# Patient Record
Sex: FEMALE | Race: WHITE | Employment: UNEMPLOYED | URBAN - METROPOLITAN AREA
[De-identification: names, ages, dates, MRNs, and addresses within clinical notes are randomized per-mention and may not be internally consistent; named-entity substitution may affect disease eponyms.]

---

## 2017-01-06 DIAGNOSIS — Z01.419 WELL WOMAN EXAM: ICD-10-CM

## 2017-01-06 NOTE — TELEPHONE ENCOUNTER
Patients mother came into the office today stating her daughter told her this morning she only has one pill left of her birth control. She had an appointment with Dr. Heriberto Lozada on Jan. 11th that was provider cancellation. We rescheduled her for the next available on Feb. 15th. Patients mother is requesting a refill to last until the appointment date.      Birth control pack says - Nortrel 1/35 tablets 21S  To [address]

## 2017-02-01 DIAGNOSIS — Z01.419 WELL WOMAN EXAM: ICD-10-CM

## 2017-02-15 ENCOUNTER — OFFICE VISIT (OUTPATIENT)
Dept: FAMILY MEDICINE CLINIC | Age: 42
End: 2017-02-15

## 2017-02-15 ENCOUNTER — HOSPITAL ENCOUNTER (OUTPATIENT)
Dept: LAB | Age: 42
Discharge: HOME | End: 2017-02-15
Payer: MEDICARE

## 2017-02-15 VITALS
RESPIRATION RATE: 16 BRPM | HEART RATE: 83 BPM | TEMPERATURE: 98.4 F | DIASTOLIC BLOOD PRESSURE: 71 MMHG | OXYGEN SATURATION: 99 % | HEIGHT: 65 IN | WEIGHT: 183 LBS | BODY MASS INDEX: 30.49 KG/M2 | SYSTOLIC BLOOD PRESSURE: 113 MMHG

## 2017-02-15 DIAGNOSIS — Z01.419 WELL WOMAN EXAM: ICD-10-CM

## 2017-02-15 DIAGNOSIS — Z01.419 WELL WOMAN EXAM WITH ROUTINE GYNECOLOGICAL EXAM: Primary | ICD-10-CM

## 2017-02-15 PROCEDURE — 88175 CYTOPATH C/V AUTO FLUID REDO: CPT | Performed by: FAMILY MEDICINE

## 2017-02-15 PROCEDURE — 87624 HPV HI-RISK TYP POOLED RSLT: CPT | Performed by: FAMILY MEDICINE

## 2017-02-15 RX ORDER — DROSPIRENONE AND ETHINYL ESTRADIOL 0.02-3(28)
1 KIT ORAL DAILY
Qty: 3 PACKAGE | Refills: 3 | Status: SHIPPED | OUTPATIENT
Start: 2017-02-15 | End: 2018-01-31 | Stop reason: SDUPTHER

## 2017-02-15 NOTE — MR AVS SNAPSHOT
Visit Information Date & Time Provider Department Dept. Phone Encounter #  
 2/15/2017  3:05 PM Minal Caraballo, [address] [facility] [phone] [id number] Upcoming Health Maintenance Date Due  
 PAP AKA CERVICAL CYTOLOGY 11/12/2018 DTaP/Tdap/Td series (2 - Td) 11/29/2021 Allergies as of 2/15/2017  Review Complete On: 2/15/2017 By: Ina Saldivar LPN No Known Allergies Current Immunizations  Reviewed on 9/27/2016 Name Date Influenza Vaccine 10/8/2013, 1/3/2013 Influenza Vaccine [place] Goring) 9/24/2016 Influenza Vaccine Split 11/29/2011 PPD 1/3/2013, 11/29/2011, 11/2/2010 TB Skin Test (PPD) Intradermal 11/11/2015, 5/30/2014 TDAP Vaccine 11/29/2011 Not reviewed this visit You Were Diagnosed With   
  
 Codes Comments Well woman exam     ICD-10-CM: Q84.893 ICD-9-CM: V72.31 Vitals BP Pulse Temp Resp Height(growth percentile) Weight(growth percentile) 113/71 (BP 1 Location: Right arm, BP Patient Position: Sitting) 83 98.4 °F (36.9 °C) (Oral) 16 5' 5\" (1.651 m) 183 lb (83 kg) LMP SpO2 BMI OB Status Smoking Status (LMP Unknown) 99% 30.45 kg/m2 Having regular periods Never Smoker Vitals History BMI and BSA Data Body Mass Index Body Surface Area  
 30.45 kg/m 2 1.95 m 2 Preferred Pharmacy Pharmacy Name Phone [facility] [place], [address] [place] [facility] [phone] Your Updated Medication List  
  
   
This list is accurate as of: 2/15/17  3:31 PM.  Always use your most recent med list.  
  
  
  
  
 ABILIFY 15 mg tablet Generic drug:  ARIPiprazole Take 15 mg by mouth daily. benzonatate 100 mg capsule Commonly known as:  TESSALON Take 1 Cap by mouth three (3) times daily as needed for Cough. buPROPion  mg SR tablet Commonly known as:  Leeta William Take 150 mg by mouth daily. butalbital-acetaminophen-caffeine -40 mg per tablet Commonly known as:  Lawerence Lied Take 1 Tab by mouth every six (6) hours as needed for Pain. Max Daily Amount: 4 Tabs. cetirizine 10 mg tablet Commonly known as:  ZYRTEC Take 1 Tab by mouth daily. hydrocortisone 1 % ointment Commonly known as:  HYCORT Apply  to affected area two (2) times a day. use thin layer  
  
 mupirocin 2 % ointment Commonly known as:  [employer] Apply  to affected area daily. norethindrone-ethinyl estradiol 1-35 mg-mcg Tab Commonly known as:  ORTHO-NOVUM 1/35 (28) Take 1 Tab by mouth daily. topiramate 100 mg tablet Commonly known as:  TOPAMAX Take  by mouth daily. Patient Instructions Fasting labs in October when the flu shot is due. Eating Healthy Foods: Care Instructions Your Care Instructions Eating healthy foods can help lower your risk for disease. Healthy food gives you energy and keeps your heart strong, your brain active, your muscles working, and your bones strong. A healthy diet includes a variety of foods from the basic food groups: grains, vegetables, fruits, milk and milk products, and meat and beans. Some people may eat more of their favorite foods from only one food group and, as a result, miss getting the nutrients they need. So, it is important to pay attention not only to what you eat but also to what you are missing from your diet. You can eat a healthy, balanced diet by making a few small changes. Follow-up care is a key part of your treatment and safety. Be sure to make and go to all appointments, and call your doctor if you are having problems. Its also a good idea to know your test results and keep a list of the medicines you take. How can you care for yourself at home? Look at what you eat · Keep a food diary for a week or two and record everything you eat or drink. Track the number of servings you eat from each food group. · For a balanced diet every day, eat a variety of: ¨ 6 or more ounce-equivalents of grains, such as cereals, breads, crackers, rice, or pasta, every day. An ounce-equivalent is 1 slice of bread, 1 cup of ready-to-eat cereal, or ½ cup of cooked rice, cooked pasta, or cooked cereal. 
¨ 2½ cups of vegetables, especially: § Dark-green vegetables such as broccoli and spinach. § Orange vegetables such as carrots and sweet potatoes. § Dry beans (such as jaimes and kidney beans) and peas (such as lentils). ¨ 2 cups of fresh, frozen, or canned fruit. A small apple or 1 banana or orange equals 1 cup. ¨ 3 cups of nonfat or low-fat milk, yogurt, or other milk products. ¨ 5½ ounces of meat and beans, such as chicken, fish, lean meat, beans, nuts, and seeds. One egg, 1 tablespoon of peanut butter, ½ ounce nuts or seeds, or ¼ cup of cooked beans equals 1 ounce of meat. · Learn how to read food labels for serving sizes and ingredients. Fast-food and convenience-food meals often contain few or no fruits or vegetables. Make sure you eat some fruits and vegetables to make the meal more nutritious. · Look at your food diary. For each food group, add up what you have eaten and then divide the total by the number of days. This will give you an idea of how much you are eating from each food group. See if you can find some ways to change your diet to make it more healthy. Start small · Do not try to make dramatic changes to your diet all at once. You might feel that you are missing out on your favorite foods and then be more likely to fail. · Start slowly, and gradually change your habits. Try some of the following: ¨ Use whole wheat bread instead of white bread. ¨ Use nonfat or low-fat milk instead of whole milk. ¨ Eat brown rice instead of white rice, and eat whole wheat pasta instead of white-flour pasta. ¨ Try low-fat cheeses and low-fat yogurt. ¨ Add more fruits and vegetables to meals and have them for snacks. ¨ Add lettuce, tomato, cucumber, and onion to sandwiches. ¨ Add fruit to yogurt and cereal. 
Enjoy food · You can still eat your favorite foods. You just may need to eat less of them. If your favorite foods are high in fat, salt, and sugar, limit how often you eat them, but do not cut them out entirely. · Eat a wide variety of foods. Make healthy choices when eating out · The type of restaurant you choose can help you make healthy choices. Even fast-food chains are now offering more low-fat or healthier choices on the menu. · Choose smaller portions, or take half of your meal home. · When eating out, try: ¨ A veggie pizza with a whole wheat crust or grilled chicken (instead of sausage or pepperoni). ¨ Pasta with roasted vegetables, grilled chicken, or marinara sauce instead of cream sauce. ¨ A vegetable wrap or grilled chicken wrap. ¨ Broiled or poached food instead of fried or breaded items. Make healthy choices easy · Buy packaged, prewashed, ready-to-eat fresh vegetables and fruits, such as baby carrots, salad mixes, and chopped or shredded broccoli and cauliflower. · Buy packaged, presliced fruits, such as melon or pineapple. · Choose 100% fruit or vegetable juice instead of soda. Limit juice intake to 4 to 6 oz (½ to ¾ cup) a day. · Blend low-fat yogurt, fruit juice, and canned or frozen fruit to make a smoothie for breakfast or a snack. Where can you learn more? Go to http://philly-greer.info/. Enter T756 in the search box to learn more about \"Eating Healthy Foods: Care Instructions. \" Current as of: November 20, 2015 Content Version: 11.1 © 1599-2738 [employer]. Care instructions adapted under license by [employer] (which disclaims liability or warranty for this information).  If you have questions about a medical condition or this instruction, always ask your healthcare professional. Sharif Rosado[employer] disclaims any warranty or liability for your use of this information. Introducing [facility]! Nichole Cobian introduces Twitpay patient portal. Now you can access parts of your medical record, email your doctor's office, and request medication refills online. 1. In your internet browser, go to [email or web address] 2. Click on the First Time User? Click Here link in the Sign In box. You will see the New Member Sign Up page. 3. Enter your Twitpay Access Code exactly as it appears below. You will not need to use this code after youve completed the sign-up process. If you do not sign up before the expiration date, you must request a new code. · Twitpay Access Code: [email or web address] Expires: 5/16/2017  3:31 PM 
 
4. Enter the last four digits of your Social Security Number (xxxx) and Date of Birth (mm/dd/yyyy) as indicated and click Submit. You will be taken to the next sign-up page. 5. Create a Twitpay ID. This will be your Twitpay login ID and cannot be changed, so think of one that is secure and easy to remember. 6. Create a Twitpay password. You can change your password at any time. 7. Enter your Password Reset Question and Answer. This can be used at a later time if you forget your password. 8. Enter your e-mail address. You will receive e-mail notification when new information is available in [address] 9. Click Sign Up. You can now view and download portions of your medical record. 10. Click the Download Summary menu link to download a portable copy of your medical information. If you have questions, please visit the Frequently Asked Questions section of the Twitpay website. Remember, Twitpay is NOT to be used for urgent needs. For medical emergencies, dial 911. Now available from your iPhone and Android! Please provide this summary of care documentation to your next provider. Your primary care clinician is listed as [address] [place] [address]. If you have any questions after today's visit, please call [phone].

## 2017-02-15 NOTE — PATIENT INSTRUCTIONS
Fasting labs in October when the flu shot is due. Eating Healthy Foods: Care Instructions  Your Care Instructions  Eating healthy foods can help lower your risk for disease. Healthy food gives you energy and keeps your heart strong, your brain active, your muscles working, and your bones strong. A healthy diet includes a variety of foods from the basic food groups: grains, vegetables, fruits, milk and milk products, and meat and beans. Some people may eat more of their favorite foods from only one food group and, as a result, miss getting the nutrients they need. So, it is important to pay attention not only to what you eat but also to what you are missing from your diet. You can eat a healthy, balanced diet by making a few small changes. Follow-up care is a key part of your treatment and safety. Be sure to make and go to all appointments, and call your doctor if you are having problems. Its also a good idea to know your test results and keep a list of the medicines you take. How can you care for yourself at home? Look at what you eat  · Keep a food diary for a week or two and record everything you eat or drink. Track the number of servings you eat from each food group. · For a balanced diet every day, eat a variety of:  ¨ 6 or more ounce-equivalents of grains, such as cereals, breads, crackers, rice, or pasta, every day. An ounce-equivalent is 1 slice of bread, 1 cup of ready-to-eat cereal, or ½ cup of cooked rice, cooked pasta, or cooked cereal.  ¨ 2½ cups of vegetables, especially:  § Dark-green vegetables such as broccoli and spinach. § Orange vegetables such as carrots and sweet potatoes. § Dry beans (such as jaimes and kidney beans) and peas (such as lentils). ¨ 2 cups of fresh, frozen, or canned fruit. A small apple or 1 banana or orange equals 1 cup. ¨ 3 cups of nonfat or low-fat milk, yogurt, or other milk products.   ¨ 5½ ounces of meat and beans, such as chicken, fish, lean meat, beans, nuts, and seeds. One egg, 1 tablespoon of peanut butter, ½ ounce nuts or seeds, or ¼ cup of cooked beans equals 1 ounce of meat. · Learn how to read food labels for serving sizes and ingredients. Fast-food and convenience-food meals often contain few or no fruits or vegetables. Make sure you eat some fruits and vegetables to make the meal more nutritious. · Look at your food diary. For each food group, add up what you have eaten and then divide the total by the number of days. This will give you an idea of how much you are eating from each food group. See if you can find some ways to change your diet to make it more healthy. Start small  · Do not try to make dramatic changes to your diet all at once. You might feel that you are missing out on your favorite foods and then be more likely to fail. · Start slowly, and gradually change your habits. Try some of the following:  ¨ Use whole wheat bread instead of white bread. ¨ Use nonfat or low-fat milk instead of whole milk. ¨ Eat brown rice instead of white rice, and eat whole wheat pasta instead of white-flour pasta. ¨ Try low-fat cheeses and low-fat yogurt. ¨ Add more fruits and vegetables to meals and have them for snacks. ¨ Add lettuce, tomato, cucumber, and onion to sandwiches. ¨ Add fruit to yogurt and cereal.  Enjoy food  · You can still eat your favorite foods. You just may need to eat less of them. If your favorite foods are high in fat, salt, and sugar, limit how often you eat them, but do not cut them out entirely. · Eat a wide variety of foods. Make healthy choices when eating out  · The type of restaurant you choose can help you make healthy choices. Even fast-food chains are now offering more low-fat or healthier choices on the menu. · Choose smaller portions, or take half of your meal home. · When eating out, try:  ¨ A veggie pizza with a whole wheat crust or grilled chicken (instead of sausage or pepperoni).   ¨ Pasta with roasted vegetables, grilled chicken, or marinara sauce instead of cream sauce. ¨ A vegetable wrap or grilled chicken wrap. ¨ Broiled or poached food instead of fried or breaded items. Make healthy choices easy  · Buy packaged, prewashed, ready-to-eat fresh vegetables and fruits, such as baby carrots, salad mixes, and chopped or shredded broccoli and cauliflower. · Buy packaged, presliced fruits, such as melon or pineapple. · Choose 100% fruit or vegetable juice instead of soda. Limit juice intake to 4 to 6 oz (½ to ¾ cup) a day. · Blend low-fat yogurt, fruit juice, and canned or frozen fruit to make a smoothie for breakfast or a snack. Where can you learn more? Go to http://philly-greer.info/. Enter T756 in the search box to learn more about \"Eating Healthy Foods: Care Instructions. \"  Current as of: November 20, 2015  Content Version: 11.1  © 1949-8558 [employer]. Care instructions adapted under license by [employer] (which disclaims liability or warranty for this information). If you have questions about a medical condition or this instruction, always ask your healthcare professional. [place] [address] any warranty or liability for your use of this information.

## 2017-02-16 NOTE — PROGRESS NOTES
Presents for well woman exam  Has no complaints  Uses OCP for cycle regulation  Recently moved into an apartment    Subjective:   42 y.o. female for Well Woman Check. No LMP recorded (lmp unknown). Social History: not sexually active. Pertinent past medical hstory: no history of HTN, DVT, CAD, DM, liver disease, migraines or smoking. Patient Active Problem List   Diagnosis Code    Well woman exam with routine gynecological exam Z01.419    Metrorrhagia N92.1    Depression F32.9    Mental retardation F79     Current Outpatient Prescriptions   Medication Sig Dispense Refill    drospirenone-ethinyl estradiol (LORYNA, 28,) 3-0.02 mg tab Take 1 Tab by mouth daily. 3 Package 3    ARIPiprazole (ABILIFY) 15 mg tablet Take 15 mg by mouth daily.  topiramate (TOPAMAX) 100 mg tablet Take  by mouth daily.  buPROPion SR (WELLBUTRIN SR) 150 mg SR tablet Take 150 mg by mouth daily. No Known Allergies     ROS:  Feeling well. No dyspnea or chest pain on exertion. No abdominal pain, change in bowel habits, black or bloody stools. No urinary tract symptoms. GYN ROS: normal menses, no abnormal bleeding, pelvic pain or discharge, no breast pain or new or enlarging lumps on self exam. No neurological complaints. Objective:     Visit Vitals    /71 (BP 1 Location: Right arm, BP Patient Position: Sitting)    Pulse 83    Temp 98.4 °F (36.9 °C) (Oral)    Resp 16    Ht 5' 5\" (1.651 m)    Wt 183 lb (83 kg)    LMP  (LMP Unknown)    SpO2 99%    BMI 30.45 kg/m2     The patient appears well, alert, oriented x 3, in no distress. ENT normal.  Neck supple. No adenopathy or thyromegaly. ANTIONETTE. Lungs are clear, good air entry, no wheezes, rhonchi or rales. S1 and S2 normal, no murmurs, regular rate and rhythm. Abdomen soft without tenderness, guarding, mass or organomegaly. Extremities show no edema, normal peripheral pulses. Neurological is normal, no focal findings.     BREAST EXAM: breasts appear normal, no suspicious masses, no skin or nipple changes or axillary nodes    PELVIC EXAM: normal external genitalia, vulva, vagina, cervix, uterus and adnexa    Assessment/Plan:   well woman  mammogram  pap smear  return annually or prn    ICD-10-CM ICD-9-CM    1. Well woman exam with routine gynecological exam Z01.419 V72.31     [V72.31]   2. Well woman exam Z01.419 V72.31 drospirenone-ethinyl estradiol (LORYNA, 28,) 3-0.02 mg tab      CLIFF MAMMO BI SCREENING INCL CAD   .

## 2017-02-26 DIAGNOSIS — Z01.419 WELL WOMAN EXAM: ICD-10-CM

## 2017-02-26 NOTE — TELEPHONE ENCOUNTER
Requested Prescriptions     Pending Prescriptions Disp Refills    norethindrone-ethinyl estradiol (Franciscasteingavino 91 1/35, 28,) 1-35 mg-mcg tab 30 Tab 0     Sig: Take 1 Tab by mouth daily.

## 2017-04-03 ENCOUNTER — HOSPITAL ENCOUNTER (OUTPATIENT)
Dept: MAMMOGRAPHY | Age: 42
Discharge: HOME | End: 2017-04-03
Attending: FAMILY MEDICINE
Payer: MEDICARE

## 2017-04-03 DIAGNOSIS — Z01.419 WELL WOMAN EXAM: ICD-10-CM

## 2017-04-03 PROCEDURE — 77067 SCR MAMMO BI INCL CAD: CPT

## 2017-04-09 ENCOUNTER — OFFICE VISIT (OUTPATIENT)
Dept: FAMILY MEDICINE CLINIC | Age: 42
End: 2017-04-09

## 2017-04-09 VITALS
SYSTOLIC BLOOD PRESSURE: 96 MMHG | HEART RATE: 69 BPM | BODY MASS INDEX: 29.16 KG/M2 | WEIGHT: 175 LBS | HEIGHT: 65 IN | RESPIRATION RATE: 16 BRPM | TEMPERATURE: 98.1 F | OXYGEN SATURATION: 99 % | DIASTOLIC BLOOD PRESSURE: 63 MMHG

## 2017-04-09 DIAGNOSIS — H00.015 HORDEOLUM EXTERNUM OF LEFT LOWER EYELID: Primary | ICD-10-CM

## 2017-04-09 DIAGNOSIS — Z13.31 SCREENING FOR DEPRESSION: ICD-10-CM

## 2017-04-09 RX ORDER — GENTAMICIN SULFATE 3 MG/ML
2 SOLUTION/ DROPS OPHTHALMIC 3 TIMES DAILY
Qty: 1 BOTTLE | Refills: 0 | Status: SHIPPED | OUTPATIENT
Start: 2017-04-09 | End: 2017-04-19

## 2017-04-09 NOTE — LETTER
4/9/2017 8:28 AM 
 
Ms. Brianna Cuevas \[facility]\"" [phone] Body mass index is 29.12 kg/(m^2). Focus on regular exercise (150 minutes each week) and healthy eating. Eat more fruits and vegetables. Eat more protein (egg whites, beans, and nuts you know you tolerate) and less carbohydrates (white bread, white rice, white pasta, white potatoes, sodas, and sweets). Eat appropriately small portion sizes. Sincerely, Jose Armando Ramos MD

## 2017-04-09 NOTE — MR AVS SNAPSHOT
Visit Information Date & Time Provider Department Dept. Phone Encounter #  
 4/9/2017  8:30 AM Kayden Wang MD [facility] [phone] [id number] Follow-up Instructions Return if symptoms worsen or fail to improve. Upcoming Health Maintenance Date Due  
 PAP AKA CERVICAL CYTOLOGY 2/15/2020 DTaP/Tdap/Td series (2 - Td) 11/29/2021 Allergies as of 4/9/2017  Review Complete On: 4/9/2017 By: Kayden Wang MD  
 No Known Allergies Current Immunizations  Reviewed on 9/27/2016 Name Date Influenza Vaccine 10/8/2013, 1/3/2013 Influenza Vaccine Serjio Lass) 9/24/2016 Influenza Vaccine Split 11/29/2011 PPD 1/3/2013, 11/29/2011, 11/2/2010 TB Skin Test (PPD) Intradermal 11/11/2015, 5/30/2014 TDAP Vaccine 11/29/2011 Not reviewed this visit You Were Diagnosed With   
  
 Codes Comments Hordeolum externum of left lower eyelid    -  Primary ICD-10-CM: H00.015 
ICD-9-CM: 373.11 Screening for depression     ICD-10-CM: Z13.89 ICD-9-CM: V79.0 BMI 29.0-29.9,adult     ICD-10-CM: Y27.77 ICD-9-CM: V85.25 see letter Vitals BP Pulse Temp Resp Height(growth percentile) Weight(growth percentile) 96/63 69 98.1 °F (36.7 °C) (Oral) 16 5' 5\" (1.651 m) 175 lb (79.4 kg) LMP SpO2 BMI OB Status Smoking Status 03/20/2017 99% 29.12 kg/m2 Having regular periods Never Smoker Vitals History BMI and BSA Data Body Mass Index Body Surface Area  
 29.12 kg/m 2 1.91 m 2 Preferred Pharmacy Pharmacy Name Phone [facility] [address] [place] [facility] [phone] Your Updated Medication List  
  
   
This list is accurate as of: 4/9/17  8:46 AM.  Always use your most recent med list.  
  
  
  
  
 ABILIFY 15 mg tablet Generic drug:  ARIPiprazole Take 15 mg by mouth daily. buPROPion  mg SR tablet Commonly known as:  Louie Gonzaleze  
 Take 150 mg by mouth daily. drospirenone-ethinyl estradiol 3-0.02 mg Tab Commonly known as:  LORYNA (28) Take 1 Tab by mouth daily. gentamicin 0.3 % ophthalmic solution Commonly known as:  GARAMYCIN Administer 2 Drops to left eye three (3) times daily for 10 days. topiramate 100 mg tablet Commonly known as:  TOPAMAX Take  by mouth daily. Prescriptions Sent to Pharmacy Refills  
 gentamicin (GARAMYCIN) 0.3 % ophthalmic solution 0 Sig: Administer 2 Drops to left eye three (3) times daily for 10 days. Class: Normal  
 [facility] [place] [address] [address] [facility] #: [phone] Route: Left Eye We Performed the Following [id number] [place] [address] [facility] [ \[facility]\""] Follow-up Instructions Return if symptoms worsen or fail to improve. Patient Instructions Stay active Use eye drops in left eye 3 times a day for 5 days Follow up if symptoms persist 
 
  
Introducing [facility]! Chip Wright introduces [employer] patient portal. Now you can access parts of your medical record, email your doctor's office, and request medication refills online. 1. In your internet browser, go to [email or web address] 2. Click on the First Time User? Click Here link in the Sign In box. You will see the New Member Sign Up page. 3. Enter your Tesco Access Code exactly as it appears below. You will not need to use this code after youve completed the sign-up process. If you do not sign up before the expiration date, you must request a new code. · [employer] Access Code: [email or web address] Expires: 5/16/2017  4:31 PM 
 
4. Enter the last four digits of your Social Security Number (xxxx) and Date of Birth (mm/dd/yyyy) as indicated and click Submit. You will be taken to the next sign-up page. 5. Create a Tesco ID.  This will be your Tesco login ID and cannot be changed, so think of one that is secure and easy to remember. 6. Create a Lumaqco password. You can change your password at any time. 7. Enter your Password Reset Question and Answer. This can be used at a later time if you forget your password. 8. Enter your e-mail address. You will receive e-mail notification when new information is available in [address] 9. Click Sign Up. You can now view and download portions of your medical record. 10. Click the Download Summary menu link to download a portable copy of your medical information. If you have questions, please visit the Frequently Asked Questions section of the Lumaqco website. Remember, Lumaqco is NOT to be used for urgent needs. For medical emergencies, dial 911. Now available from your iPhone and Android! Please provide this summary of care documentation to your next provider. Your primary care clinician is listed as [address] [place] [address]. If you have any questions after today's visit, please call [phone].

## 2017-04-09 NOTE — PROGRESS NOTES
Chief Complaint   Patient presents with    Eye Problem     watering--started on friday--     1. Have you been to the ER, urgent care clinic since your last visit? Hospitalized since your last visit? no    2. Have you seen or consulted any other health care providers outside of the [address] [place] [address] since your last visit? Include any pap smears or colon screening.  No

## 2017-04-09 NOTE — PROGRESS NOTES
Glennon Call is a 42 y.o. female      Issues discussed today include:        Signs and symptoms:  Sty left eye  Duration:  3 days  Context:  Some seasonal allergies in Spring  Location:  Inner lower left lid OS  Quality:  sty  Severity:  No vision issues  Timing:  now  Modifying factors:  Rx drops    She now lives alone in apartment with support from [facility] (called '[facility] Living\")    Data reviewed or ordered today:  PHQ9 = 3, no Si/HI    Other problems include:  Patient Active Problem List   Diagnosis Code    Metrorrhagia N92.1    Depression F32.9    Mental retardation F79       Medications:  Current Outpatient Prescriptions   Medication Sig Dispense Refill    gentamicin (GARAMYCIN) 0.3 % ophthalmic solution Administer 2 Drops to left eye three (3) times daily for 10 days. 1 Bottle 0    drospirenone-ethinyl estradiol (LORYNA, 28,) 3-0.02 mg tab Take 1 Tab by mouth daily. 3 Package 3    ARIPiprazole (ABILIFY) 15 mg tablet Take 15 mg by mouth daily.  topiramate (TOPAMAX) 100 mg tablet Take  by mouth daily.  buPROPion SR (WELLBUTRIN SR) 150 mg SR tablet Take 150 mg by mouth daily. Allergies:  No Known Allergies    LMP:  Patient's last menstrual period was 03/20/2017. Social History     Social History    Marital status:      Spouse name: N/A    Number of children: N/A    Years of education: N/A     Occupational History    Not on file.      Social History Main Topics    Smoking status: Never Smoker    Smokeless tobacco: Never Used    Alcohol use No    Drug use: No    Sexual activity: Yes     Partners: Male     Birth control/ protection: Pill     Other Topics Concern    Not on file     Social History Narrative         Family History   Problem Relation Age of Onset    Depression Mother     Cancer Father      colon         Meaningful use:  done      ROS:  Headaches:  no  Chest Pain:  no  SOB:  no  Fevers:  no  Other significant ROS:  No falls, depression controlled with Rx, she sees Dr. Raphael Sutton, no SI/HI    Patient's last menstrual period was 03/20/2017. Physical Exam  Visit Vitals    BP 96/63    Pulse 69    Temp 98.1 °F (36.7 °C) (Oral)    Resp 16    Ht 5' 5\" (1.651 m)    Wt 175 lb (79.4 kg)    LMP 03/20/2017    SpO2 99%    BMI 29.12 kg/m2     BP Readings from Last 3 Encounters:   04/09/17 96/63   02/15/17 113/71   12/19/16 114/75     Constitutional:  Appears well,  No Acute Distress, Vitals noted  Psychiatric:   Affect normal, Alert and cooperative, Oriented to person/place/time    Eyes:   Pupils equally round and reactive, EOMI, conjunctiva clear, eyelids normal OD/sty lower inner lid OS  ENT:   External ears and nose normal/lips, teeth=OK/gums normal, TMs and Orophyarynx normal  Neck:   general inspection and Thyroid normal.  No abnormal cervical or supraclavicular nodes    Lungs:   clear to auscultation, good respiratory effort  Heart: Ausculation normal.  Regular rhythm. No cardiac murmurs. No carotid bruits or palpable thrills  Chest wall normal  Extremities:   without edema, good peripheral pulses  Skin:   Warm to palpation, without rashes, bruising, or suspicious lesions           Assessment:    Patient Active Problem List   Diagnosis Code    Metrorrhagia N92.1    Depression F32.9    Mental retardation F79       Today's diagnoses are:    ICD-10-CM ICD-9-CM    1. Hordeolum externum of left lower eyelid H00.015 373.11 gentamicin (GARAMYCIN) 0.3 % ophthalmic solution   2. Screening for depression Z13.89 V79.0 MN DEPRESSION SCREEN ANNUAL   3. BMI 29.0-29.9,adult Z68.29 V85.25     see letter       Plan:  Orders Placed This Encounter    MN DEPRESSION SCREEN ANNUAL    gentamicin (GARAMYCIN) 0.3 % ophthalmic solution     Sig: Administer 2 Drops to left eye three (3) times daily for 10 days.      Dispense:  1 Bottle     Refill:  0       See patient instructions  Patient Instructions   Stay active    Use eye drops in left eye 3 times a day for 5 days    Follow up if symptoms persist        refresh note:  done    AVS Printed:  done    I have reviewed/discussed the above normal BMI with the patient. I have recommended the following interventions: monitor weight .  .  letter

## 2017-07-13 ENCOUNTER — OFFICE VISIT (OUTPATIENT)
Dept: FAMILY MEDICINE CLINIC | Age: 42
End: 2017-07-13

## 2017-07-13 VITALS
RESPIRATION RATE: 18 BRPM | HEIGHT: 65 IN | OXYGEN SATURATION: 99 % | SYSTOLIC BLOOD PRESSURE: 104 MMHG | HEART RATE: 81 BPM | TEMPERATURE: 98.2 F | BODY MASS INDEX: 29.32 KG/M2 | WEIGHT: 176 LBS | DIASTOLIC BLOOD PRESSURE: 65 MMHG

## 2017-07-13 DIAGNOSIS — F79 MENTAL RETARDATION: ICD-10-CM

## 2017-07-13 DIAGNOSIS — N30.90 BLADDER INFECTION: Primary | ICD-10-CM

## 2017-07-13 LAB
BILIRUB UR QL STRIP: NEGATIVE
GLUCOSE UR-MCNC: NEGATIVE MG/DL
KETONES P FAST UR STRIP-MCNC: NEGATIVE MG/DL
PH UR STRIP: 6 [PH] (ref 4.6–8)
PROT UR QL STRIP: NEGATIVE MG/DL
SP GR UR STRIP: 1.02 (ref 1–1.03)
UA UROBILINOGEN AMB POC: NORMAL (ref 0.2–1)
URINALYSIS CLARITY POC: CLEAR
URINALYSIS COLOR POC: YELLOW
URINE BLOOD POC: NORMAL
URINE LEUKOCYTES POC: NEGATIVE
URINE NITRITES POC: NEGATIVE

## 2017-07-13 RX ORDER — LEVOFLOXACIN 250 MG/1
250 TABLET ORAL DAILY
Qty: 7 TAB | Refills: 0 | Status: SHIPPED | OUTPATIENT
Start: 2017-07-13 | End: 2017-07-20

## 2017-07-13 NOTE — PROGRESS NOTES
Eliezer Frias is a 36 y.o. female      Issues discussed today include:        Signs and symptoms:  dysuria  Duration:  24 hours  Context:  Some nausea and pain over bladder  Location:    Quality:  burns  Severity:  No fevers, paulo blood in urine (she is not menstruating)  Timing:  now  Modifying factors:  Rx levaquin    Data reviewed or ordered today:  Urine culture    Other problems include:  Patient Active Problem List   Diagnosis Code    Metrorrhagia N92.1    Depression F32.9    Mental retardation F79       Medications:  Current Outpatient Prescriptions   Medication Sig Dispense Refill    levoFLOXacin (LEVAQUIN) 250 mg tablet Take 1 Tab by mouth daily for 7 days. 7 Tab 0    drospirenone-ethinyl estradiol (LORYNA, 28,) 3-0.02 mg tab Take 1 Tab by mouth daily. 3 Package 3    ARIPiprazole (ABILIFY) 15 mg tablet Take 15 mg by mouth daily.  topiramate (TOPAMAX) 100 mg tablet Take  by mouth daily.  buPROPion SR (WELLBUTRIN SR) 150 mg SR tablet Take 150 mg by mouth daily. Allergies:  No Known Allergies    LMP:  No LMP recorded. Social History     Social History    Marital status:      Spouse name: N/A    Number of children: N/A    Years of education: N/A     Occupational History    Not on file. Social History Main Topics    Smoking status: Never Smoker    Smokeless tobacco: Never Used    Alcohol use No    Drug use: No    Sexual activity: Yes     Partners: Male     Birth control/ protection: Pill     Other Topics Concern    Not on file     Social History Narrative         Family History   Problem Relation Age of Onset    Depression Mother     Cancer Father      colon         Meaningful use:  done      ROS:  Headaches:  no  Chest Pain:  no  SOB:  no  Fevers:  no  Other significant ROS:  No h/o kidney stones    No LMP recorded.     Physical Exam  Visit Vitals    /65 (BP 1 Location: Left arm, BP Patient Position: Sitting)    Pulse 81    Temp 98.2 °F (36.8 °C) (Oral)    Resp 18    Ht 5' 5\" (1.651 m)    Wt 176 lb (79.8 kg)    SpO2 99%    BMI 29.29 kg/m2     BP Readings from Last 3 Encounters:   07/13/17 104/65   04/09/17 96/63   02/15/17 113/71     Constitutional:  Appears well,  No Acute Distress, Vitals noted  Psychiatric:   Affect normal, Alert and cooperative, Oriented to person/place/time    Eyes:   Pupils equally round and reactive, EOMI, conjunctiva clear, eyelids normal  ENT:   External ears and nose normal/lips, teeth=OK/gums normal, TMs and Orophyarynx normal  Neck:   general inspection and Thyroid normal.  No abnormal cervical or supraclavicular nodes    Lungs:   clear to auscultation, good respiratory effort  Heart: Ausculation normal.  Regular rhythm. No cardiac murmurs. No carotid bruits or palpable thrills  Chest wall normal.  No CVAT  Abd:  Benign but tender over bladder  Extremities:   without edema, good peripheral pulses  Skin:   Warm to palpation, without rashes, bruising, or suspicious lesions           Assessment:    Patient Active Problem List   Diagnosis Code    Metrorrhagia N92.1    Depression F32.9    Mental retardation F79       Today's diagnoses are:    ICD-10-CM ICD-9-CM    1. Bladder infection N30.90 595.9 AMB POC URINALYSIS DIP STICK AUTO W/O MICRO      CULTURE, URINE      levoFLOXacin (LEVAQUIN) 250 mg tablet   2. Mental retardation F79 319        Plan:  Orders Placed This Encounter    CULTURE, URINE    AMB POC URINALYSIS DIP STICK AUTO W/O MICRO    levoFLOXacin (LEVAQUIN) 250 mg tablet     Sig: Take 1 Tab by mouth daily for 7 days.      Dispense:  7 Tab     Refill:  0       See patient instructions  Patient Instructions   Drink plenty of fluids    Take antibiotic one pill daily for 7 days    If worse go to ER        refresh note:  done    AVS Printed:  done    I spoke with patient and her mom

## 2017-07-13 NOTE — MR AVS SNAPSHOT
Visit Information Date & Time Provider Department Dept. Phone Encounter #  
 7/13/2017  3:30 PM Brandon White MD [facility] [phone] [id number] Upcoming Health Maintenance Date Due INFLUENZA AGE 9 TO ADULT 8/1/2017 PAP AKA CERVICAL CYTOLOGY 2/15/2020 DTaP/Tdap/Td series (2 - Td) 11/29/2021 Allergies as of 7/13/2017  Review Complete On: 7/13/2017 By: Brandon White MD  
 No Known Allergies Current Immunizations  Reviewed on 9/27/2016 Name Date Influenza Vaccine 10/8/2013, 1/3/2013 Influenza Vaccine Geraldene Victoria) 9/24/2016 Influenza Vaccine Split 11/29/2011 PPD 1/3/2013, 11/29/2011, 11/2/2010 TB Skin Test (PPD) Intradermal 11/11/2015, 5/30/2014 TDAP Vaccine 11/29/2011 Not reviewed this visit You Were Diagnosed With   
  
 Codes Comments Bladder infection    -  Primary ICD-10-CM: N30.90 ICD-9-CM: 595.9 Mental retardation     ICD-10-CM: F79 
ICD-9-CM: 007 Vitals BP Pulse Temp Resp Height(growth percentile) Weight(growth percentile) 104/65 (BP 1 Location: Left arm, BP Patient Position: Sitting) 81 98.2 °F (36.8 °C) (Oral) 18 5' 5\" (1.651 m) 176 lb (79.8 kg) SpO2 BMI OB Status Smoking Status 99% 29.29 kg/m2 Having regular periods Never Smoker Vitals History BMI and BSA Data Body Mass Index Body Surface Area  
 29.29 kg/m 2 1.91 m 2 Preferred Pharmacy Pharmacy Name Phone [facility] [place], [address] [place] AT [facility] [phone] Your Updated Medication List  
  
   
This list is accurate as of: 7/13/17  4:08 PM.  Always use your most recent med list.  
  
  
  
  
 ABILIFY 15 mg tablet Generic drug:  ARIPiprazole Take 15 mg by mouth daily. buPROPion  mg SR tablet Commonly known as:  Juan David Balk Take 150 mg by mouth daily. drospirenone-ethinyl estradiol 3-0.02 mg Tab Commonly known as:  LORYNA (28) Take 1 Tab by mouth daily. levoFLOXacin 250 mg tablet Commonly known as:  Luciana Lugo Take 1 Tab by mouth daily for 7 days. topiramate 100 mg tablet Commonly known as:  TOPAMAX Take  by mouth daily. Prescriptions Sent to Pharmacy Refills  
 levoFLOXacin (LEVAQUIN) 250 mg tablet 0 Sig: Take 1 Tab by mouth daily for 7 days. Class: Normal  
 Pharmacy: [facility] [address] [facility] #: [phone] Route: Oral  
  
We Performed the Following AMB POC URINALYSIS DIP STICK AUTO W/O MICRO [65810 CPT(R)] CULTURE, URINE [id number] CPT(R)] Patient Instructions Drink plenty of fluids Take antibiotic one pill daily for 7 days If worse go to ER [facility]! Any Portillo introduces Appirio patient portal. Now you can access parts of your medical record, email your doctor's office, and request medication refills online. 1. In your internet browser, go to [email or web address] 2. Click on the First Time User? Click Here link in the Sign In box. You will see the New Member Sign Up page. 3. Enter your Appirio Access Code exactly as it appears below. You will not need to use this code after youve completed the sign-up process. If you do not sign up before the expiration date, you must request a new code. · Appirio Access Code: [email or web address] Expires: 10/11/2017  4:08 PM 
 
4. Enter the last four digits of your Social Security Number (xxxx) and Date of Birth (mm/dd/yyyy) as indicated and click Submit. You will be taken to the next sign-up page. 5. Create a G-Snap!t ID. This will be your Appirio login ID and cannot be changed, so think of one that is secure and easy to remember. 6. Create a G-Snap!t password. You can change your password at any time. 7. Enter your Password Reset Question and Answer. This can be used at a later time if you forget your password. 8. Enter your e-mail address. You will receive e-mail notification when new information is available in [address] 9. Click Sign Up. You can now view and download portions of your medical record. 10. Click the Download Summary menu link to download a portable copy of your medical information. If you have questions, please visit the Frequently Asked Questions section of the Guroo website. Remember, Guroo is NOT to be used for urgent needs. For medical emergencies, dial 911. Now available from your iPhone and Android! Please provide this summary of care documentation to your next provider. Your primary care clinician is listed as [facility], . If you have any questions after today's visit, please call [phone].

## 2017-07-13 NOTE — LETTER
7/17/2017 4:50 PM 
 
[facility] [phone] Dear Citlali Oquendo: 
 
Please find your most recent results below. Resulted Orders AMB POC URINALYSIS DIP STICK AUTO W/O MICRO Result Value Ref Range Color (UA POC) Yellow Clarity (UA POC) Clear Glucose (UA POC) Negative Negative Bilirubin (UA POC) Negative Negative Ketones (UA POC) Negative Negative Specific gravity (UA POC) 1.020 1.001 - 1.035 Blood (UA POC) 2+ Negative pH (UA POC) 6.0 4.6 - 8.0 Protein (UA POC) Negative Negative mg/dL Urobilinogen (UA POC) 1 mg/dL 0.2 - 1 Nitrites (UA POC) Negative Negative Leukocyte esterase (UA POC) Negative Negative CULTURE, URINE Result Value Ref Range Urine Culture, Routine No growth Narrative Performed at:  [address] [place] [address]  [id number] : Geri Jain MD, Phone:  [phone] Your urine culture was not helpful.  Finish the antibiotics.  Follow up if your symptoms persist.  If you feel better, nothing else to do. Sincerely, Gosia Palacios MD

## 2017-07-15 LAB — BACTERIA UR CULT: NO GROWTH

## 2017-07-17 NOTE — PROGRESS NOTES
Your urine culture was not helpful. Finish the antibiotics. Follow up if your symptoms persist.  If you feel better, nothing else to do.

## 2017-08-01 ENCOUNTER — OFFICE VISIT (OUTPATIENT)
Dept: FAMILY MEDICINE CLINIC | Age: 42
End: 2017-08-01

## 2017-08-01 VITALS
HEART RATE: 83 BPM | SYSTOLIC BLOOD PRESSURE: 97 MMHG | WEIGHT: 176.6 LBS | DIASTOLIC BLOOD PRESSURE: 52 MMHG | BODY MASS INDEX: 29.42 KG/M2 | HEIGHT: 65 IN | TEMPERATURE: 98.5 F | OXYGEN SATURATION: 98 % | RESPIRATION RATE: 17 BRPM

## 2017-08-01 DIAGNOSIS — L03.115 CELLULITIS OF RIGHT LOWER EXTREMITY: Primary | ICD-10-CM

## 2017-08-01 PROBLEM — L03.90 CELLULITIS: Status: ACTIVE | Noted: 2017-08-01

## 2017-08-01 RX ORDER — CEPHALEXIN 500 MG/1
500 CAPSULE ORAL 2 TIMES DAILY
Qty: 14 CAP | Refills: 0 | Status: SHIPPED | OUTPATIENT
Start: 2017-08-01 | End: 2017-08-08

## 2017-08-01 NOTE — PROGRESS NOTES
Chief Complaint   Patient presents with    Insect Bite     right leg, warm to touch, red, patient says it itches and it hurts, since Sunday.

## 2017-08-01 NOTE — PATIENT INSTRUCTIONS

## 2017-08-01 NOTE — MR AVS SNAPSHOT
Visit Information Date & Time Provider Department Dept. Phone Encounter #  
 8/1/2017  6:00 PM Rena Schilder, [facility] [phone] [id number] Your Appointments 8/15/2017  4:00 PM  
Medicare Physical with Yared Humphrey MD  
[facility] ([address]) Appt Note: [facility]   
 [address] [place] [address] [address] [place] [address]  
[phone]  
  
   
 [address] [place] [address] 3 Sierra Scales [address] Upcoming Health Maintenance Date Due INFLUENZA AGE 9 TO ADULT 8/1/2017 PAP AKA CERVICAL CYTOLOGY 2/15/2020 DTaP/Tdap/Td series (2 - Td) 11/29/2021 Allergies as of 8/1/2017  Review Complete On: 8/1/2017 By: Gloria Zavala LPN No Known Allergies Current Immunizations  Reviewed on 9/27/2016 Name Date Influenza Vaccine 10/8/2013, 1/3/2013 Influenza Vaccine Minnie Emery) 9/24/2016 Influenza Vaccine Split 11/29/2011 PPD 1/3/2013, 11/29/2011, 11/2/2010 TB Skin Test (PPD) Intradermal 11/11/2015, 5/30/2014 TDAP Vaccine 11/29/2011 Not reviewed this visit Vitals BP Pulse Temp Resp Height(growth percentile) Weight(growth percentile) 97/52 (BP 1 Location: Right arm, BP Patient Position: Sitting) 83 98.5 °F (36.9 °C) (Oral) 17 5' 5\" (1.651 m) 176 lb 9.6 oz (80.1 kg) SpO2 BMI OB Status Smoking Status 98% 29.39 kg/m2 Having regular periods Never Smoker Vitals History BMI and BSA Data Body Mass Index Body Surface Area  
 29.39 kg/m 2 1.92 m 2 Preferred Pharmacy Pharmacy Name Phone [facility] [address] AT [facility] [phone] Your Updated Medication List  
  
   
This list is accurate as of: 8/1/17  6:19 PM.  Always use your most recent med list.  
  
  
  
  
 ABILIFY 15 mg tablet Generic drug:  ARIPiprazole Take 15 mg by mouth daily. BENADRYL ALLERGY PO Take  by mouth. buPROPion  mg SR tablet Commonly known as:  Hawkins Perish Take 150 mg by mouth daily. cephALEXin 500 mg capsule Commonly known as:  Polo Walker Take 1 Cap by mouth two (2) times a day for 7 days. drospirenone-ethinyl estradiol 3-0.02 mg Tab Commonly known as:  LORYNA (28) Take 1 Tab by mouth daily. topiramate 100 mg tablet Commonly known as:  TOPAMAX Take  by mouth daily. Prescriptions Sent to Pharmacy Refills  
 cephALEXin (KEFLEX) 500 mg capsule 0 Sig: Take 1 Cap by mouth two (2) times a day for 7 days. Class: Normal  
 Pharmacy: [facility] [address] 500 [facility] #: [phone] Route: Oral  
  
Patient Instructions Cellulitis: Care Instructions Your Care Instructions Cellulitis is a skin infection. It often occurs after a break in the skin from a scrape, cut, bite, or puncture, or after a rash. The doctor has checked you carefully, but problems can develop later. If you notice any problems or new symptoms, get medical treatment right away. Follow-up care is a key part of your treatment and safety. Be sure to make and go to all appointments, and call your doctor if you are having problems. It's also a good idea to know your test results and keep a list of the medicines you take. How can you care for yourself at home? · Take your antibiotics as directed. Do not stop taking them just because you feel better. You need to take the full course of antibiotics. · Prop up the infected area on pillows to reduce pain and swelling. Try to keep the area above the level of your heart as often as you can. · If your doctor told you how to care for your wound, follow your doctor's instructions. If you did not get instructions, follow this general advice: ¨ Wash the wound with clean water 2 times a day. Don't use hydrogen peroxide or alcohol, which can slow healing. ¨ You may cover the wound with a thin layer of petroleum jelly, such as Vaseline, and a nonstick bandage. ¨ Apply more petroleum jelly and replace the bandage as needed. · Be safe with medicines. Take pain medicines exactly as directed. ¨ If the doctor gave you a prescription medicine for pain, take it as prescribed. ¨ If you are not taking a prescription pain medicine, ask your doctor if you can take an over-the-counter medicine. To prevent cellulitis in the future · Try to prevent cuts, scrapes, or other injuries to your skin. Cellulitis most often occurs where there is a break in the skin. · If you get a scrape, cut, mild burn, or bite, wash the wound with clean water as soon as you can to help avoid infection. Don't use hydrogen peroxide or alcohol, which can slow healing. · If you have swelling in your legs (edema), support stockings and good skin care may help prevent leg sores and cellulitis. · Take care of your feet, especially if you have diabetes or other conditions that increase the risk of infection. Wear shoes and socks. Do not go barefoot. If you have athlete's foot or other skin problems on your feet, talk to your doctor about how to treat them. When should you call for help? Call your doctor now or seek immediate medical care if: 
· You have signs that your infection is getting worse, such as: 
¨ Increased pain, swelling, warmth, or redness. ¨ Red streaks leading from the area. ¨ Pus draining from the area. ¨ A fever. · You get a rash. Watch closely for changes in your health, and be sure to contact your doctor if: 
· You are not getting better after 1 day (24 hours). · You do not get better as expected. Where can you learn more? Go to http://philly-greer.info/. Patti Nicole in the search box to learn more about \"Cellulitis: Care Instructions. \" Current as of: October 13, 2016 Content Version: 11.3 © 1990-9368 Healthwise, Incorporated. Care instructions adapted under license by [employer]! (which disclaims liability or warranty for this information). If you have questions about a medical condition or this instruction, always ask your healthcare professional. [address] any warranty or liability for your use of this information. Introducing [facility]! Any Portillo introduces Chumen Wenwen patient portal. Now you can access parts of your medical record, email your doctor's office, and request medication refills online. 1. In your internet browser, go to [email or web address] 2. Click on the First Time User? Click Here link in the Sign In box. You will see the New Member Sign Up page. 3. Enter your Chumen Wenwen Access Code exactly as it appears below. You will not need to use this code after youve completed the sign-up process. If you do not sign up before the expiration date, you must request a new code. · Ch[employer]wen Access Code: [email or web address] Expires: 10/11/2017  4:08 PM 
 
4. Enter the last four digits of your Social Security Number (xxxx) and Date of Birth (mm/dd/yyyy) as indicated and click Submit. You will be taken to the next sign-up page. 5. Create a Chumen Wenwen ID. This will be your Chumen Wenwen login ID and cannot be changed, so think of one that is secure and easy to remember. 6. Create a Chumen Wenwen password. You can change your password at any time. 7. Enter your Password Reset Question and Answer. This can be used at a later time if you forget your password. 8. Enter your e-mail address. You will receive e-mail notification when new information is available in [address] 9. Click Sign Up. You can now view and download portions of your medical record. 10. Click the Download Summary menu link to download a portable copy of your medical information.  
 
If you have questions, please visit the Frequently Asked Questions section of the Abiogenix. Remember, Ideal [employer] is NOT to be used for urgent needs. For medical emergencies, dial 911. Now available from your iPhone and Android! Please provide this summary of care documentation to your next provider. Your primary care clinician is listed as [address] [place] [address], . If you have any questions after today's visit, please call [phone].

## 2017-08-01 NOTE — PROGRESS NOTES
MAMIE Patel is a 36 y.o. female who presents with bug bites. 2 nights ago (Sunday 7/30 PM), pt felt a bug bite on her right leg. Increased pruritus; erythema slightly improved. No edema or raised borders; no bulls eye rash. Noticed 2 pustules; 1 has gotten slightly smaller. Used benadryl cream last night; has used 2 times with some relief. No other treatments. Has not been outside or in the woods. Has been afebrile, no chills. No bites/ rash between fingers or toes. No close contacts with similar symptoms. PMHx:  Past Medical History:   Diagnosis Date    Bipolar 1 disorder ([facility] [address]     Calculus of kidney     Depression     MR (mental retardation)        Meds:   Current Outpatient Prescriptions   Medication Sig Dispense Refill    drospirenone-ethinyl estradiol (LORYNA, 28,) 3-0.02 mg tab Take 1 Tab by mouth daily. 3 Package 3    ARIPiprazole (ABILIFY) 15 mg tablet Take 15 mg by mouth daily.  topiramate (TOPAMAX) 100 mg tablet Take  by mouth daily.  buPROPion SR (WELLBUTRIN SR) 150 mg SR tablet Take 150 mg by mouth daily. Allergies:   No Known Allergies    Smoker:  History   Smoking Status    Never Smoker   Smokeless Tobacco    Never Used       ETOH:   History   Alcohol Use No       FH:   Family History   Problem Relation Age of Onset    Depression Mother     Cancer Father      colon       ROS:  Review of Systems   Constitutional: Negative for chills, diaphoresis, fatigue and fever. HENT: Negative. Eyes: Negative for visual disturbance. Respiratory: Negative for shortness of breath. Cardiovascular: Negative for chest pain. Gastrointestinal: Negative for abdominal pain, nausea and vomiting. Musculoskeletal: Negative. Negative for joint swelling, myalgias and neck stiffness. Skin: Positive for rash. Negative for pallor. Neurological: Negative for weakness and headaches.        Physical Exam:  Visit Vitals    BP 97/52 (BP 1 Location: Right arm, BP Patient Position: Sitting)    Pulse 83    Temp 98.5 °F (36.9 °C) (Oral)    Resp 17    Ht 5' 5\" (1.651 m)    Wt 176 lb 9.6 oz (80.1 kg)    SpO2 98%    BMI 29.39 kg/m2       Wt Readings from Last 3 Encounters:   07/13/17 176 lb (79.8 kg)   04/09/17 175 lb (79.4 kg)   02/15/17 183 lb (83 kg)     BP Readings from Last 3 Encounters:   07/13/17 104/65   04/09/17 96/63   02/15/17 113/71        Physical Exam   Constitutional: She is oriented to person, place, and time. She appears well-developed and well-nourished. No distress. HENT:   Head: Normocephalic and atraumatic. Eyes: No scleral icterus. Neck: Normal range of motion. Cardiovascular: Normal rate and regular rhythm. No murmur heard. Pulmonary/Chest: Effort normal and breath sounds normal. No respiratory distress. She has no wheezes. Musculoskeletal:   Normal ROM. No edema or tenderness. Neurological: She is alert and oriented to person, place, and time. Skin: She is not diaphoretic. RLE with Erythema and warmth, ~ 13 cm. 0.5 cm and 0.25 cm pustule. Pruritic. Psychiatric: She has a normal mood and affect. Her behavior is normal.   Nursing note and vitals reviewed. Assessment     36 y.o. female with hx of MR, depression presents with area of cellulitis on RLE. Patient Active Problem List   Diagnosis Code    Metrorrhagia N92.1    Depression F32.9    Mental retardation F79       Today's diagnoses are:    ICD-10-CM ICD-9-CM    1. Cellulitis of right lower extremity L03.115 682.6               Plan     1. Cellulitis of RLE - afebrile, no systemic signs or symptoms. With mild relief of pruritus on benadryl cream.   - cephalexin 500 BID x 7 days for possible cellulitis  - benadryl cream PRN for pruritus. Follow up as needed. Prior labs and imaging were reviewed. I have discussed the diagnosis with the patient and the intended plan as seen in the above orders.  The patient has received an after-visit summary and questions were answered concerning future plans. I have discussed medication side effects and warnings with the patient as well. Patient discussed with Dr. Chad Prince, Attending Physician.     Kristi Mehta MD, PGY2  Family Medicine Resident

## 2017-09-18 ENCOUNTER — OFFICE VISIT (OUTPATIENT)
Dept: FAMILY MEDICINE CLINIC | Age: 42
End: 2017-09-18

## 2017-09-18 VITALS
OXYGEN SATURATION: 100 % | DIASTOLIC BLOOD PRESSURE: 63 MMHG | BODY MASS INDEX: 29.66 KG/M2 | WEIGHT: 178 LBS | RESPIRATION RATE: 18 BRPM | HEIGHT: 65 IN | TEMPERATURE: 98.4 F | SYSTOLIC BLOOD PRESSURE: 95 MMHG | HEART RATE: 82 BPM

## 2017-09-18 DIAGNOSIS — Z00.00 MEDICARE ANNUAL WELLNESS VISIT, SUBSEQUENT: Primary | ICD-10-CM

## 2017-09-18 DIAGNOSIS — Z23 ENCOUNTER FOR IMMUNIZATION: ICD-10-CM

## 2017-09-18 DIAGNOSIS — Z13.220 SCREENING FOR HYPERLIPIDEMIA: ICD-10-CM

## 2017-09-18 DIAGNOSIS — Z13.1 SCREENING FOR DIABETES MELLITUS: ICD-10-CM

## 2017-09-18 NOTE — PROGRESS NOTES
Chief Complaint   Patient presents with    Annual Wellness Visit     no concerns today. . here for medicare wellness questionaire. 1. Have you been to the ER, urgent care clinic since your last visit? Hospitalized since your last visit? No    2. Have you seen or consulted any other health care providers outside of the [facility] since your last visit? Include any pap smears or colon screening. No     Pt here with Mom. .      General Health Questions   -During the past 4 weeks:   -how would you rate your health in general? Good   -how often have you been bothered by feeling dizzy when standing up? never   -how much have you been bothered by bodily pain? not   -Have you noticed any hearing difficulties? no   -has your physical and emotional health limited your social activities with family or friends? no    Emotional Health Questions   -Do you have a history of depression, anxiety, or emotional problems? no  -Over the past 2 weeks, have you felt down, depressed or hopeless? no  -Over the past 2 weeks, have you felt little interest or pleasure in doing things? no    Health Habits   Please describe your diet habits: good  Do you get 5 servings of fruits or vegetables daily? yes  Do you exercise regularly? yes    Activities of Daily Living and Functional Status   -Do you need help with eating, walking, dressing, bathing, toileting, the phone, transportation, shopping, preparing meals, housework, laundry, medications or managing money? nono    -In the past four weeks, was someone available to help you if you needed and wanted help with anything? yes  -Are you confident are you that you can control and manage most of your health problems? yes  -Have you been given information to help you keep track of your medications? yes  -How often do you have trouble taking your medications as prescribed? never    Fall Risk and Home Safety   Have you fallen 2 or more times in the past year?  no  Does your home have rugs in the hallway, lack grab bars in the bathroom, lack handrails on the stairs or have poor lighting? no  Do you have smoke detectors and check them regularly? yes  Do you have difficulties driving a car? no and n/a doesn't drive. .   Do you always fasten your seat belt when you are in a car? yes

## 2017-09-18 NOTE — PROGRESS NOTES
[address]  [address] [facility] [place]   [phone]             Date of visit: 9/18/2017       This is an Subsequent Medicare Annual Wellness Visit (AWV), (Performed more than 12 months after effective date of Medicare Part B enrollment and 12 months after last preventive visit, Once in a lifetime)    I have reviewed the patient's medical history in detail and updated the computerized patient record. History obtained from: mother and the patient. Concerns today   (Patient understands that medical problems addressed today may incur additional cost as this is a preventive visit)  -None, the pt recently had physical exam with Pap smear    History     Patient Active Problem List   Diagnosis Code    Metrorrhagia N92.1    Depression F32.9    Mental retardation F79    Cellulitis L03.90     Past Medical History:   Diagnosis Date    [facility] 1 disorder ([facility] [address]     Calculus of kidney     Depression     MR (mental retardation)       History reviewed. No pertinent surgical history. No Known Allergies  Current Outpatient Prescriptions   Medication Sig Dispense Refill    drospirenone-ethinyl estradiol (LORYNA, 28,) 3-0.02 mg tab Take 1 Tab by mouth daily. 3 Package 3    ARIPiprazole (ABILIFY) 15 mg tablet Take 15 mg by mouth daily.  topiramate (TOPAMAX) 100 mg tablet Take  by mouth daily.  buPROPion SR (WELLBUTRIN SR) 150 mg SR tablet Take 150 mg by mouth daily.  DIPHENHYDRAMINE HCL (BENADRYL ALLERGY PO) Take  by mouth.        Family History   Problem Relation Age of Onset    Depression Mother     Cancer Father      colon     Social History   Substance Use Topics    Smoking status: Never Smoker    Smokeless tobacco: Never Used    Alcohol use No       Specialists/Care Team   Jadiel Chua has established care with the following healthcare providers:  -Dr. John Hernández ( psychiatrist) - usually sees him every 3 months, who is managing her depression. Health Risk Assessment     Demographics   female  36 y.o. [address] Questions   -During the past 4 weeks:   -how would you rate your health in general? Good   -how often have you been bothered by feeling dizzy when standing up? never   -how much have you been bothered by bodily pain? not   -Have you noticed any hearing difficulties? no   -has your physical and emotional health limited your social activities with family or friends? no    Emotional Health Questions   -Do you have a history of depression, anxiety, or emotional problems? no  -Over the past 2 weeks, have you felt down, depressed or hopeless? no  -Over the past 2 weeks, have you felt little interest or pleasure in doing things? no    Health Habits   Please describe your diet habits: Good. Veggies, fruits and salads  Do you get 5 servings of fruits or vegetables daily? yes  Do you exercise regularly? yes    Activities of Daily Living and Functional Status   -Do you need help with eating, walking, dressing, bathing, toileting, the phone, transportation, shopping, preparing meals, housework, laundry, medications or managing money? no  -In the past four weeks, was someone available to help you if you needed and wanted help with anything? yes  -Are you confident are you that you can control and manage most of your health problems? yes  -Have you been given information to help you keep track of your medications? yes  -How often do you have trouble taking your medications as prescribed? never    Fall Risk and Home Safety   Have you fallen 2 or more times in the past year? no  Does your home have rugs in the hallway, lack grab bars in the bathroom, lack handrails on the stairs or have poor lighting? yes  Do you have smoke detectors and check them regularly?  yes  Do you have difficulties driving a car?  does not drive  Do you always fasten your seat belt when you are in a car? yes    Review of Systems (if indicated for problems addressed today)   Not indicated    Physical Examination     Vitals:    09/18/17 1627   BP: 95/63   Pulse: 82   Resp: 18   Temp: 98.4 °F (36.9 °C)   TempSrc: Oral   SpO2: 100%   Weight: 178 lb (80.7 kg)   Height: 5' 5\" (1.651 m)     Body mass index is 29.62 kg/(m^2). No exam data present    Evaluation of Cognitive Function   Mood/affect:  neutral  Orientation: Person, Place and Time  Appearance: age appropriate and casually dressed  Family member/caregiver input: Mother helps the pt, however pt lives alone and taking care of her self    Preventive Services     Discussed today Done Previously Not Needed      X Pneumococcal vaccines   X   Flu vaccine     X Hepatitis B vaccine (if at risk)     X Shingles vaccine    X(11/2011)  TDAP vaccine    X(4/2017)  Mammogram    X(2/2017)  Pap smear     X Colorectal cancer screening     X Low-dose CT for lung cancer screening     X Bone density test     X Glaucoma screening     X Cholesterol test     X Diabetes screening test      X Diabetes self-management class     X Nutritionist referral for diabetes or renal disease       Assessment/Plan   V70.0    ICD-10-CM ICD-9-CM    1. Medicare annual wellness visit, subsequent L12.26 A64.7 METABOLIC PANEL, COMPREHENSIVE      CBC W/O DIFF      LIPID PANEL   2. Screening for diabetes mellitus E27.2 V01.5 METABOLIC PANEL, COMPREHENSIVE   3. Screening for hyperlipidemia Z13.220 V77.91    4. Encounter for immunization Z23 V03.89 INFLUENZA VIRUS VAC QUAD,SPLIT,PRESV FREE SYRINGE IM      ADMIN INFLUENZA VIRUS VAC       Orders Placed This Encounter    INFLUENZA VIRUS VACCINE QUADRIVALENT, PRESERVATIVE FREE SYRINGE (41391)    METABOLIC PANEL, COMPREHENSIVE    CBC W/O DIFF    LIPID PANEL    ADMIN INFLUENZA VIRUS VAC       Follow-up Disposition:  Return if symptoms worsen or fail to improve.     Gabino Trivedi MD

## 2017-09-18 NOTE — PATIENT INSTRUCTIONS
Discussed today Done Previously Not Needed      X Pneumococcal vaccines   X   Flu vaccine     X Hepatitis B vaccine (if at risk)     X Shingles vaccine    X(11/2011)  TDAP vaccine    X(4/2017)  Mammogram    X(2/2017)  Pap smear     X Colorectal cancer screening     X Low-dose CT for lung cancer screening     X Bone density test     X Glaucoma screening     X Cholesterol test     X Diabetes screening test      X Diabetes self-management class     X Nutritionist referral for diabetes or renal disease        Well Visit, Ages 25 to 48: Care Instructions  Your Care Instructions  Physical exams can help you stay healthy. Your doctor has checked your overall health and may have suggested ways to take good care of yourself. He or she also may have recommended tests. At home, you can help prevent illness with healthy eating, regular exercise, and other steps. Follow-up care is a key part of your treatment and safety. Be sure to make and go to all appointments, and call your doctor if you are having problems. It's also a good idea to know your test results and keep a list of the medicines you take. How can you care for yourself at home? · Reach and stay at a healthy weight. This will lower your risk for many problems, such as obesity, diabetes, heart disease, and high blood pressure. · Get at least 30 minutes of physical activity on most days of the week. Walking is a good choice. You also may want to do other activities, such as running, swimming, cycling, or playing tennis or team sports. Discuss any changes in your exercise program with your doctor. · Do not smoke or allow others to smoke around you. If you need help quitting, talk to your doctor about stop-smoking programs and medicines. These can increase your chances of quitting for good. · Talk to your doctor about whether you have any risk factors for sexually transmitted infections (STIs).  Having one sex partner (who does not have STIs and does not have sex with anyone else) is a good way to avoid these infections. · Use birth control if you do not want to have children at this time. Talk with your doctor about the choices available and what might be best for you. · Protect your skin from too much sun. When you're outdoors from 10 a.m. to 4 p.m., stay in the shade or cover up with clothing and a hat with a wide brim. Wear sunglasses that block UV rays. Even when it's cloudy, put broad-spectrum sunscreen (SPF 30 or higher) on any exposed skin. · See a dentist one or two times a year for checkups and to have your teeth cleaned. · Wear a seat belt in the car. · Drink alcohol in moderation, if at all. That means no more than 2 drinks a day for men and 1 drink a day for women. Follow your doctor's advice about when to have certain tests. These tests can spot problems early. For everyone  · Cholesterol. Have the fat (cholesterol) in your blood tested after age 21. Your doctor will tell you how often to have this done based on your age, family history, or other things that can increase your risk for heart disease. · Blood pressure. Have your blood pressure checked during a routine doctor visit. Your doctor will tell you how often to check your blood pressure based on your age, your blood pressure results, and other factors. · Vision. Talk with your doctor about how often to have a glaucoma test.  · Diabetes. Ask your doctor whether you should have tests for diabetes. · Colon cancer. Have a test for colon cancer at age 48. You may have one of several tests. If you are younger than 48, you may need a test earlier if you have any risk factors. Risk factors include whether you already had a precancerous polyp removed from your colon or whether your parent, brother, sister, or child has had colon cancer.   For women  · Breast exam and mammogram. Talk to your doctor about when you should have a clinical breast exam and a mammogram. Medical experts differ on whether and how often women under 50 should have these tests. Your doctor can help you decide what is right for you. · Pap test and pelvic exam. Begin Pap tests at age 24. A Pap test is the best way to find cervical cancer. The test often is part of a pelvic exam. Ask how often to have this test.  · Tests for sexually transmitted infections (STIs). Ask whether you should have tests for STIs. You may be at risk if you have sex with more than one person, especially if your partners do not wear condoms. For men  · Tests for sexually transmitted infections (STIs). Ask whether you should have tests for STIs. You may be at risk if you have sex with more than one person, especially if you do not wear a condom. · Testicular cancer exam. Ask your doctor whether you should check your testicles regularly. · Prostate exam. Talk to your doctor about whether you should have a blood test (called a PSA test) for prostate cancer. Experts differ on whether and when men should have this test. Some experts suggest it if you are older than 39 and are -American or have a father or brother who got prostate cancer when he was younger than 72. When should you call for help? Watch closely for changes in your health, and be sure to contact your doctor if you have any problems or symptoms that concern you. Where can you learn more? Go to http://philly-greer.info/. Enter P072 in the search box to learn more about \"Well Visit, Ages 25 to 48: Care Instructions. \"  Current as of: July 19, 2016  Content Version: 11.3  © 7213-5177 [employer]. Care instructions adapted under license by [employer] (which disclaims liability or warranty for this information). If you have questions about a medical condition or this instruction, always ask your healthcare professional. [place] [address] any warranty or liability for your use of this information.

## 2017-09-18 NOTE — MR AVS SNAPSHOT
Visit Information Date & Time Provider Department Dept. Phone Encounter #  
 9/18/2017  4:00 PM Missael Saldivar MD [address] [place] [address] [phone] [id number] Upcoming Health Maintenance Date Due INFLUENZA AGE 9 TO ADULT 8/1/2017 PAP AKA CERVICAL CYTOLOGY 2/15/2020 DTaP/Tdap/Td series (2 - Td) 11/29/2021 Allergies as of 9/18/2017  Review Complete On: 9/18/2017 By: Missael Saldivar MD  
 No Known Allergies Current Immunizations  Reviewed on 9/18/2017 Name Date Influenza Vaccine 10/8/2013, 1/3/2013 Influenza Vaccine [place]) 9/24/2016 Influenza Vaccine (Quad) PF  Incomplete Influenza Vaccine Split 11/29/2011 PPD 1/3/2013, 11/29/2011, 11/2/2010 TB Skin Test (PPD) Intradermal 11/11/2015, 5/30/2014 TDAP Vaccine 11/29/2011 Reviewed by Missael Saldivar MD on 9/18/2017 at  4:51 PM  
 Reviewed by Missael Saldivar MD on 9/18/2017 at  4:53 PM  
You Were Diagnosed With   
  
 Codes Comments Medicare annual wellness visit, subsequent    -  Primary ICD-10-CM: Z00.00 ICD-9-CM: V70.0 Screening for diabetes mellitus     ICD-10-CM: Z13.1 ICD-9-CM: V77.1 Screening for hyperlipidemia     ICD-10-CM: Z13.220 ICD-9-CM: V77.91 Encounter for immunization     ICD-10-CM: C14 ICD-9-CM: V03.89 Vitals BP Pulse Temp Resp Height(growth percentile) Weight(growth percentile) 95/63 82 98.4 °F (36.9 °C) (Oral) 18 5' 5\" (1.651 m) 178 lb (80.7 kg) SpO2 BMI OB Status Smoking Status 100% 29.62 kg/m2 Medically Induced Never Smoker BMI and BSA Data Body Mass Index Body Surface Area  
 29.62 kg/m 2 1.92 m 2 Preferred Pharmacy Pharmacy Name Phone [facility] [place], [address] [place] [facility] [phone] Your Updated Medication List  
  
   
This list is accurate as of: 9/18/17  5:05 PM.  Always use your most recent med list.  
  
 ABILIFY 15 mg tablet Generic drug:  ARIPiprazole Take 15 mg by mouth daily. BENADRYL ALLERGY PO Take  by mouth. buPROPion  mg SR tablet Commonly known as:  Alimarco Marty Take 150 mg by mouth daily. drospirenone-ethinyl estradiol 3-0.02 mg Tab Commonly known as:  LORYNA (28) Take 1 Tab by mouth daily. topiramate 100 mg tablet Commonly known as:  TOPAMAX Take  by mouth daily. We Performed the Following ADMIN INFLUENZA VIRUS VAC [ [facility] [place]] CBC W/O DIFF [50972 CPT(R)] INFLUENZA VIRUS VAC QUAD,SPLIT,PRESV FREE SYRINGE IM [id number] CPT(R)] LIPID PANEL [03302 CPT(R)] To-Do List   
 09/18/2017 Lab:  METABOLIC PANEL, COMPREHENSIVE Patient Instructions Discussed today Done Previously Not Needed X Pneumococcal vaccines X   Flu vaccine X Hepatitis B vaccine (if at risk) X Shingles vaccine X(11/2011)  TDAP vaccine X(4/2017)  Mammogram  
 X(2/2017)  Pap smear X Colorectal cancer screening X Low-dose CT for lung cancer screening X Bone density test  
  X Glaucoma screening X Cholesterol test  
  X Diabetes screening test   
  X Diabetes self-management class X Nutritionist referral for diabetes or renal disease Well Visit, Ages 25 to 48: Care Instructions Your Care Instructions Physical exams can help you stay healthy. Your doctor has checked your overall health and may have suggested ways to take good care of yourself. He or she also may have recommended tests. At home, you can help prevent illness with healthy eating, regular exercise, and other steps. Follow-up care is a key part of your treatment and safety. Be sure to make and go to all appointments, and call your doctor if you are having problems. It's also a good idea to know your test results and keep a list of the medicines you take. How can you care for yourself at home? · Reach and stay at a healthy weight. This will lower your risk for many problems, such as obesity, diabetes, heart disease, and high blood pressure. · Get at least 30 minutes of physical activity on most days of the week. Walking is a good choice. You also may want to do other activities, such as running, swimming, cycling, or playing tennis or team sports. Discuss any changes in your exercise program with your doctor. · Do not smoke or allow others to smoke around you. If you need help quitting, talk to your doctor about stop-smoking programs and medicines. These can increase your chances of quitting for good. · Talk to your doctor about whether you have any risk factors for sexually transmitted infections (STIs). Having one sex partner (who does not have STIs and does not have sex with anyone else) is a good way to avoid these infections. · Use birth control if you do not want to have children at this time. Talk with your doctor about the choices available and what might be best for you. · Protect your skin from too much sun. When you're outdoors from 10 a.m. to 4 p.m., stay in the shade or cover up with clothing and a hat with a wide brim. Wear sunglasses that block UV rays. Even when it's cloudy, put broad-spectrum sunscreen (SPF 30 or higher) on any exposed skin. · See a dentist one or two times a year for checkups and to have your teeth cleaned. · Wear a seat belt in the car. · Drink alcohol in moderation, if at all. That means no more than 2 drinks a day for men and 1 drink a day for women. Follow your doctor's advice about when to have certain tests. These tests can spot problems early. For everyone · Cholesterol. Have the fat (cholesterol) in your blood tested after age 21. Your doctor will tell you how often to have this done based on your age, family history, or other things that can increase your risk for heart disease. · Blood pressure. Have your blood pressure checked during a routine doctor visit. Your doctor will tell you how often to check your blood pressure based on your age, your blood pressure results, and other factors. · Vision. Talk with your doctor about how often to have a glaucoma test. 
· Diabetes. Ask your doctor whether you should have tests for diabetes. · Colon cancer. Have a test for colon cancer at age 48. You may have one of several tests. If you are younger than 48, you may need a test earlier if you have any risk factors. Risk factors include whether you already had a precancerous polyp removed from your colon or whether your parent, brother, sister, or child has had colon cancer. For women · Breast exam and mammogram. Talk to your doctor about when you should have a clinical breast exam and a mammogram. Medical experts differ on whether and how often women under 50 should have these tests. Your doctor can help you decide what is right for you. · Pap test and pelvic exam. Begin Pap tests at age 24. A Pap test is the best way to find cervical cancer. The test often is part of a pelvic exam. Ask how often to have this test. 
· Tests for sexually transmitted infections (STIs). Ask whether you should have tests for STIs. You may be at risk if you have sex with more than one person, especially if your partners do not wear condoms. For men · Tests for sexually transmitted infections (STIs). Ask whether you should have tests for STIs. You may be at risk if you have sex with more than one person, especially if you do not wear a condom. · Testicular cancer exam. Ask your doctor whether you should check your testicles regularly. · Prostate exam. Talk to your doctor about whether you should have a blood test (called a PSA test) for prostate cancer.  Experts differ on whether and when men should have this test. Some experts suggest it if you are older than 39 and are -American or have a father or brother who got prostate cancer when he was younger than 72. When should you call for help? Watch closely for changes in your health, and be sure to contact your doctor if you have any problems or symptoms that concern you. Where can you learn more? Go to http://philly-greer.info/. Enter P072 in the search box to learn more about \"Well Visit, Ages 25 to 48: Care Instructions. \" Current as of: July 19, 2016 Content Version: 11.3 © 8136-0785 [employer]. Care instructions adapted under license by [employer] (which disclaims liability or warranty for this information). If you have questions about a medical condition or this instruction, always ask your healthcare professional. [address] any warranty or liability for your use of this information. Introducing [facility] & HEALTH SERVICES! Rell Munoz introduces Dinetouch patient portal. Now you can access parts of your medical record, email your doctor's office, and request medication refills online. 1. In your internet browser, go to [email or web address]. [email or web address] Robie/[employer] 2. Click on the First Time User? Click Here link in the Sign In box. You will see the New Member Sign Up page. 3. Enter your Dinetouch Access Code exactly as it appears below. You will not need to use this code after youve completed the sign-up process. If you do not sign up before the expiration date, you must request a new code. · Dinetouch Access Code: [email or web address] Expires: 10/11/2017  4:08 PM 
 
4. Enter the last four digits of your Social Security Number (xxxx) and Date of Birth (mm/dd/yyyy) as indicated and click Submit. You will be taken to the next sign-up page. 5. Create a Dinetouch ID. This will be your Dinetouch login ID and cannot be changed, so think of one that is secure and easy to remember. 6. Create a [employer] password. You can change your password at any time. 7. Enter your Password Reset Question and Answer. This can be used at a later time if you forget your password. 8. Enter your e-mail address. You will receive e-mail notification when new information is available in [address] 9. Click Sign Up. You can now view and download portions of your medical record. 10. Click the Download Summary menu link to download a portable copy of your medical information. If you have questions, please visit the Frequently Asked Questions section of the [employer] website. Remember, [employer] is NOT to be used for urgent needs. For medical emergencies, dial 911. Now available from your iPhone and Android! Please provide this summary of care documentation to your next provider. Your primary care clinician is listed as [facility], . If you have any questions after today's visit, please call [phone].

## 2018-01-16 ENCOUNTER — OFFICE VISIT (OUTPATIENT)
Dept: FAMILY MEDICINE CLINIC | Age: 43
End: 2018-01-16

## 2018-01-16 VITALS
OXYGEN SATURATION: 97 % | SYSTOLIC BLOOD PRESSURE: 116 MMHG | DIASTOLIC BLOOD PRESSURE: 73 MMHG | HEART RATE: 77 BPM | WEIGHT: 189.8 LBS | BODY MASS INDEX: 31.62 KG/M2 | HEIGHT: 65 IN | TEMPERATURE: 98 F | RESPIRATION RATE: 16 BRPM

## 2018-01-16 DIAGNOSIS — J02.9 SORE THROAT: ICD-10-CM

## 2018-01-16 DIAGNOSIS — J06.9 VIRAL URI: Primary | ICD-10-CM

## 2018-01-16 LAB
S PYO AG THROAT QL: NEGATIVE
VALID INTERNAL CONTROL?: YES

## 2018-01-16 RX ORDER — NAPROXEN SODIUM 220 MG
220 TABLET ORAL AS NEEDED
COMMUNITY

## 2018-01-16 NOTE — MR AVS SNAPSHOT
[address] 
[phone] Patient: Tirso Lozano MRN: [id number] PTV:[id number] Visit Information Date & Time Provider Department Dept. Phone Encounter #  
 1/16/2018  3:30 PM Davi Britton, [address] [place] [phone] [id number] Follow-up Instructions Return if symptoms worsen or fail to improve. Upcoming Health Maintenance Date Due  
 PAP AKA CERVICAL CYTOLOGY 2/15/2020 DTaP/Tdap/Td series (2 - Td) 11/29/2021 Allergies as of 1/16/2018  Review Complete On: 1/16/2018 By: Bree Rivero No Known Allergies Current Immunizations  Reviewed on 9/18/2017 Name Date Influenza Vaccine 10/8/2013, 1/3/2013 Influenza Vaccine Natasha Pillow) 9/24/2016 Influenza Vaccine (Quad) PF 9/18/2017 Influenza Vaccine Split 11/29/2011 PPD 1/3/2013, 11/29/2011, 11/2/2010 TB Skin Test (PPD) Intradermal 11/11/2015, 5/30/2014 TDAP Vaccine 11/29/2011 Not reviewed this visit You Were Diagnosed With   
  
 Codes Comments Sore throat    -  Primary ICD-10-CM: J02.9 ICD-9-CM: 586 Vitals BP Pulse Temp Resp Height(growth percentile) Weight(growth percentile) 116/73 (BP 1 Location: Left arm, BP Patient Position: Sitting) 77 98 °F (36.7 °C) (Oral) 16 5' 5\" (1.651 m) 189 lb 12.8 oz (86.1 kg) SpO2 BMI OB Status Smoking Status 97% 31.58 kg/m2 Medically Induced Never Smoker Vitals History BMI and BSA Data Body Mass Index Body Surface Area 31.58 kg/m 2 1.99 m 2 Preferred Pharmacy Pharmacy Name Phone [facility] [address] [place] [facility] [phone] Your Updated Medication List  
  
   
This list is accurate as of: 1/16/18  4:06 PM.  Always use your most recent med list.  
  
  
  
  
 ABILIFY 15 mg tablet Generic drug:  ARIPiprazole Take 15 mg by mouth daily. ALEVE 220 mg tablet Generic drug:  naproxen sodium Take 220 mg by mouth two (2) times daily (with meals). BENADRYL ALLERGY PO Take  by mouth. buPROPion  mg SR tablet Commonly known as:  Eladia Marino Take 150 mg by mouth daily. drospirenone-ethinyl estradiol 3-0.02 mg Tab Commonly known as:  LORYNA (28) Take 1 Tab by mouth daily. topiramate 100 mg tablet Commonly known as:  TOPAMAX Take  by mouth daily. We Performed the Following AMB POC RAPID STREP A [63067 CPT(R)] Follow-up Instructions Return if symptoms worsen or fail to improve. Patient Instructions Viral Respiratory Infection: Care Instructions Your Care Instructions Viruses are very small organisms. They grow in number after they enter your body. There are many types that cause different illnesses, such as colds and the mumps. The symptoms of a viral respiratory infection often start quickly. They include a fever, sore throat, and runny nose. You may also just not feel well. Or you may not want to eat much. Most viral respiratory infections are not serious. They usually get better with time and self-care. Antibiotics are not used to treat a viral infection. That's because antibiotics will not help cure a viral illness. In some cases, antiviral medicine can help your body fight a serious viral infection. Follow-up care is a key part of your treatment and safety. Be sure to make and go to all appointments, and call your doctor if you are having problems. It's also a good idea to know your test results and keep a list of the medicines you take. How can you care for yourself at home? · Rest as much as possible until you feel better. · Be safe with medicines. Take your medicine exactly as prescribed. Call your doctor if you think you are having a problem with your medicine.  You will get more details on the specific medicine your doctor prescribes. · Take an over-the-counter pain medicine, such as acetaminophen (Tylenol), ibuprofen (Advil, Motrin), or naproxen (Aleve), as needed for pain and fever. Read and follow all instructions on the label. Do not give aspirin to anyone younger than 20. It has been linked to Reye syndrome, a serious illness. · Drink plenty of fluids, enough so that your urine is light yellow or clear like water. Hot fluids, such as tea or soup, may help relieve congestion in your nose and throat. If you have kidney, heart, or liver disease and have to limit fluids, talk with your doctor before you increase the amount of fluids you drink. · Try to clear mucus from your lungs by breathing deeply and coughing. · Gargle with warm salt water once an hour. This can help reduce swelling and throat pain. Use 1 teaspoon of salt mixed in 1 cup of warm water. · Do not smoke or allow others to smoke around you. If you need help quitting, talk to your doctor about stop-smoking programs and medicines. These can increase your chances of quitting for good. To avoid spreading the virus · Cough or sneeze into a tissue. Then throw the tissue away. · If you don't have a tissue, use your hand to cover your cough or sneeze. Then clean your hand. You can also cough into your sleeve. · Wash your hands often. Use soap and warm water. Wash for 15 to 20 seconds each time. · If you don't have soap and water near you, you can clean your hands with alcohol wipes or gel. When should you call for help? Call your doctor now or seek immediate medical care if: 
? · You have a new or higher fever. ? · Your fever lasts more than 48 hours. ? · You have trouble breathing. ? · You have a fever with a stiff neck or a severe headache. ? · You are sensitive to light. ? · You feel very sleepy or confused. ? Watch closely for changes in your health, and be sure to contact your doctor if: ? · You do not get better as expected. Where can you learn more? Go to http://philly-greer.info/. Enter F307 in the search box to learn more about \"Viral Respiratory Infection: Care Instructions. \" Current as of: May 12, 2017 Content Version: 11.4 © 2737-3257 [employer]. Care instructions adapted under license by [employer] (which disclaims liability or warranty for this information). If you have questions about a medical condition or this instruction, always ask your healthcare professional. [address] any warranty or liability for your use of this information. Introducing [facility] & [facility] SERVICES! Carrington Carrion introduces Triangulate patient portal. Now you can access parts of your medical record, email your doctor's office, and request medication refills online. 1. In your internet browser, go to [email or web address]. [employer] 2. Click on the First Time User? Click Here link in the Sign In box. You will see the New Member Sign Up page. 3. Enter your Triangulate Access Code exactly as it appears below. You will not need to use this code after youve completed the sign-up process. If you do not sign up before the expiration date, you must request a new code. · Triangulate Access Code: [email or web address] Expires: 4/16/2018  3:48 PM 
 
4. Enter the last four digits of your Social Security Number (xxxx) and Date of Birth (mm/dd/yyyy) as indicated and click Submit. You will be taken to the next sign-up page. 5. Create a Triangulate ID. This will be your Triangulate login ID and cannot be changed, so think of one that is secure and easy to remember. 6. Create a Tri[employer]ulate password. You can change your password at any time. 7. Enter your Password Reset Question and Answer. This can be used at a later time if you forget your password. 8. Enter your e-mail address. You will receive e-mail notification when new information is available in [address] 9. Click Sign Up. You can now view and download portions of your medical record. 10. Click the Download Summary menu link to download a portable copy of your medical information. If you have questions, please visit the Frequently Asked Questions section of the [email or web address] website. Remember, [email or web address] is NOT to be used for urgent needs. For medical emergencies, dial 911. Now available from your iPhone and Android! Please provide this summary of care documentation to your next provider. Your primary care clinician is listed as [address] [place] [address]. If you have any questions after today's visit, please call [phone].

## 2018-01-16 NOTE — PATIENT INSTRUCTIONS
Viral Respiratory Infection: Care Instructions  Your Care Instructions    Viruses are very small organisms. They grow in number after they enter your body. There are many types that cause different illnesses, such as colds and the mumps. The symptoms of a viral respiratory infection often start quickly. They include a fever, sore throat, and runny nose. You may also just not feel well. Or you may not want to eat much. Most viral respiratory infections are not serious. They usually get better with time and self-care. Antibiotics are not used to treat a viral infection. That's because antibiotics will not help cure a viral illness. In some cases, antiviral medicine can help your body fight a serious viral infection. Follow-up care is a key part of your treatment and safety. Be sure to make and go to all appointments, and call your doctor if you are having problems. It's also a good idea to know your test results and keep a list of the medicines you take. How can you care for yourself at home? · Rest as much as possible until you feel better. · Be safe with medicines. Take your medicine exactly as prescribed. Call your doctor if you think you are having a problem with your medicine. You will get more details on the specific medicine your doctor prescribes. · Take an over-the-counter pain medicine, such as acetaminophen (Tylenol), ibuprofen (Advil, Motrin), or naproxen (Aleve), as needed for pain and fever. Read and follow all instructions on the label. Do not give aspirin to anyone younger than 20. It has been linked to Reye syndrome, a serious illness. · Drink plenty of fluids, enough so that your urine is light yellow or clear like water. Hot fluids, such as tea or soup, may help relieve congestion in your nose and throat. If you have kidney, heart, or liver disease and have to limit fluids, talk with your doctor before you increase the amount of fluids you drink.   · Try to clear mucus from your lungs by breathing deeply and coughing. · Gargle with warm salt water once an hour. This can help reduce swelling and throat pain. Use 1 teaspoon of salt mixed in 1 cup of warm water. · Do not smoke or allow others to smoke around you. If you need help quitting, talk to your doctor about stop-smoking programs and medicines. These can increase your chances of quitting for good. To avoid spreading the virus  · Cough or sneeze into a tissue. Then throw the tissue away. · If you don't have a tissue, use your hand to cover your cough or sneeze. Then clean your hand. You can also cough into your sleeve. · Wash your hands often. Use soap and warm water. Wash for 15 to 20 seconds each time. · If you don't have soap and water near you, you can clean your hands with alcohol wipes or gel. When should you call for help? Call your doctor now or seek immediate medical care if:  ? · You have a new or higher fever. ? · Your fever lasts more than 48 hours. ? · You have trouble breathing. ? · You have a fever with a stiff neck or a severe headache. ? · You are sensitive to light. ? · You feel very sleepy or confused. ? Watch closely for changes in your health, and be sure to contact your doctor if:  ? · You do not get better as expected. Where can you learn more? Go to http://philly-greer.info/. Enter M200 in the search box to learn more about \"Viral Respiratory Infection: Care Instructions. \"  Current as of: May 12, 2017  Content Version: 11.4  © 8647-2090 [employer]. Care instructions adapted under license by [employer] (which disclaims liability or warranty for this information). If you have questions about a medical condition or this instruction, always ask your healthcare professional. [address] any warranty or liability for your use of this information.

## 2018-01-16 NOTE — PROGRESS NOTES
Chief Complaint   Patient presents with    Sore Throat    Headache     on sunday but has gone away    Generalized Body Aches     There are no preventive care reminders to display for this patient. Coordination of Care Questions    1. Have you been to the ER, urgent care clinic since your last visit? No       Hospitalized since your last visit? No    2. Have you seen or consulted any other health care providers outside of the [address] [place] [address] since your last visit? Include any pap smears or colon screening.  Dr. Marquis Portillo (psych)

## 2018-01-31 DIAGNOSIS — Z01.419 WELL WOMAN EXAM: ICD-10-CM

## 2018-01-31 RX ORDER — DROSPIRENONE AND ETHINYL ESTRADIOL 0.02-3(28)
KIT ORAL
Qty: 84 TAB | Refills: 0 | Status: SHIPPED | OUTPATIENT
Start: 2018-01-31 | End: 2018-04-23 | Stop reason: SDUPTHER

## 2018-09-21 ENCOUNTER — OFFICE VISIT (OUTPATIENT)
Dept: FAMILY MEDICINE CLINIC | Age: 43
End: 2018-09-21

## 2018-09-21 VITALS
OXYGEN SATURATION: 100 % | SYSTOLIC BLOOD PRESSURE: 117 MMHG | TEMPERATURE: 98.2 F | HEART RATE: 81 BPM | DIASTOLIC BLOOD PRESSURE: 77 MMHG | HEIGHT: 65 IN | WEIGHT: 172.8 LBS | RESPIRATION RATE: 16 BRPM | BODY MASS INDEX: 28.79 KG/M2

## 2018-09-21 DIAGNOSIS — Z23 ENCOUNTER FOR IMMUNIZATION: ICD-10-CM

## 2018-09-21 DIAGNOSIS — Z12.39 SCREENING FOR BREAST CANCER: ICD-10-CM

## 2018-09-21 DIAGNOSIS — Z00.00 MEDICARE ANNUAL WELLNESS VISIT, SUBSEQUENT: Primary | ICD-10-CM

## 2018-09-21 NOTE — PROGRESS NOTES
Here for Medicare Wellness visit    I reviewed with the resident the medical history and the resident's findings on the physical examination. I discussed with the resident the patient's diagnosis and concur with the plan.

## 2018-09-21 NOTE — PROGRESS NOTES
Identified Patient with two Patient identifiers (Name and ). Two Patient Identifiers confirmed. Reviewed record in preparation for visit and have obtained necessary documentation. Chief Complaint   Patient presents with    Annual Wellness Visit    Immunization/Injection     Influenza Vaccine       Visit Vitals    /77 (BP 1 Location: Right arm, BP Patient Position: Sitting)    Pulse 81    Temp 98.2 °F (36.8 °C) (Oral)    Resp 16    Ht 5' 5\" (1.651 m)    Wt 172 lb 12.8 oz (78.4 kg)    SpO2 100%    BMI 28.76 kg/m2       1. Have you been to the ER, urgent care clinic since your last visit? Hospitalized since your last visit? No    2. Have you seen or consulted any other health care providers outside of the [address] [place] [address] since your last visit? Include any pap smears or colon screening.  No

## 2018-09-21 NOTE — PATIENT INSTRUCTIONS
Discussed today Done Previously Not Needed      X Pneumococcal vaccines   X   Flu vaccine     X Hepatitis B vaccine (if at risk)     X Shingles vaccine    X 11/29/2011  TDAP vaccine    X  4/2017  Mammogram    X (2/15/2017)  Pap smear     X Colorectal cancer screening     X Low-dose CT for lung cancer screening     X Bone density test     X Glaucoma screening   X   Cholesterol test   X   Diabetes screening test      X Diabetes self-management class     X Nutritionist referral for diabetes or renal disease        Well Visit, Ages 25 to 48: Care Instructions  Your Care Instructions    Physical exams can help you stay healthy. Your doctor has checked your overall health and may have suggested ways to take good care of yourself. He or she also may have recommended tests. At home, you can help prevent illness with healthy eating, regular exercise, and other steps. Follow-up care is a key part of your treatment and safety. Be sure to make and go to all appointments, and call your doctor if you are having problems. It's also a good idea to know your test results and keep a list of the medicines you take. How can you care for yourself at home? · Reach and stay at a healthy weight. This will lower your risk for many problems, such as obesity, diabetes, heart disease, and high blood pressure. · Get at least 30 minutes of physical activity on most days of the week. Walking is a good choice. You also may want to do other activities, such as running, swimming, cycling, or playing tennis or team sports. Discuss any changes in your exercise program with your doctor. · Do not smoke or allow others to smoke around you. If you need help quitting, talk to your doctor about stop-smoking programs and medicines. These can increase your chances of quitting for good. · Talk to your doctor about whether you have any risk factors for sexually transmitted infections (STIs).  Having one sex partner (who does not have STIs and does not have sex with anyone else) is a good way to avoid these infections. · Use birth control if you do not want to have children at this time. Talk with your doctor about the choices available and what might be best for you. · Protect your skin from too much sun. When you're outdoors from 10 a.m. to 4 p.m., stay in the shade or cover up with clothing and a hat with a wide brim. Wear sunglasses that block UV rays. Even when it's cloudy, put broad-spectrum sunscreen (SPF 30 or higher) on any exposed skin. · See a dentist one or two times a year for checkups and to have your teeth cleaned. · Wear a seat belt in the car. · Drink alcohol in moderation, if at all. That means no more than 2 drinks a day for men and 1 drink a day for women. Follow your doctor's advice about when to have certain tests. These tests can spot problems early. For everyone  · Cholesterol. Have the fat (cholesterol) in your blood tested after age 21. Your doctor will tell you how often to have this done based on your age, family history, or other things that can increase your risk for heart disease. · Blood pressure. Have your blood pressure checked during a routine doctor visit. Your doctor will tell you how often to check your blood pressure based on your age, your blood pressure results, and other factors. · Vision. Talk with your doctor about how often to have a glaucoma test.  · Diabetes. Ask your doctor whether you should have tests for diabetes. · Colon cancer. Have a test for colon cancer at age 48. You may have one of several tests. If you are younger than 48, you may need a test earlier if you have any risk factors. Risk factors include whether you already had a precancerous polyp removed from your colon or whether your parent, brother, sister, or child has had colon cancer.   For women  · Breast exam and mammogram. Talk to your doctor about when you should have a clinical breast exam and a mammogram. Medical experts differ on whether and how often women under 50 should have these tests. Your doctor can help you decide what is right for you. · Pap test and pelvic exam. Begin Pap tests at age 24. A Pap test is the best way to find cervical cancer. The test often is part of a pelvic exam. Ask how often to have this test.  · Tests for sexually transmitted infections (STIs). Ask whether you should have tests for STIs. You may be at risk if you have sex with more than one person, especially if your partners do not wear condoms. For men  · Tests for sexually transmitted infections (STIs). Ask whether you should have tests for STIs. You may be at risk if you have sex with more than one person, especially if you do not wear a condom. · Testicular cancer exam. Ask your doctor whether you should check your testicles regularly. · Prostate exam. Talk to your doctor about whether you should have a blood test (called a PSA test) for prostate cancer. Experts differ on whether and when men should have this test. Some experts suggest it if you are older than 39 and are -American or have a father or brother who got prostate cancer when he was younger than 72. When should you call for help? Watch closely for changes in your health, and be sure to contact your doctor if you have any problems or symptoms that concern you. Where can you learn more? Go to http://philly-greer.info/. Enter P072 in the search box to learn more about \"Well Visit, Ages 25 to 48: Care Instructions. \"  Current as of: May 16, 2017  Content Version: 11.7  © 7300-7816 [employer]. Care instructions adapted under license by [employer] (which disclaims liability or warranty for this information). If you have questions about a medical condition or this instruction, always ask your healthcare professional. [place] [address] any warranty or liability for your use of this information.

## 2018-09-21 NOTE — PROGRESS NOTES
[address]  [address] [facility] [address]  [phone]             Date of visit: 9/21/2018       This is an Subsequent Medicare Annual Wellness Visit (AWV), (Performed more than 12 months after effective date of Medicare Part B enrollment and 12 months after last preventive visit, Once in a lifetime)    I have reviewed the patient's medical history in detail and updated the computerized patient record. History obtained from: mother and the patient. The patient was qualified for Medicare at the age of 24 due to mental retardation. Concerns today   (Patient understands that medical problems addressed today may incur additional cost as this is a preventive visit)  -No acute complains today    History     Patient Active Problem List   Diagnosis Code    Metrorrhagia N92.1    Depression F32.9    Mental retardation F79    Cellulitis L03.90     Past Medical History:   Diagnosis Date    Bipolar 1 disorder ([facility] [address]     Calculus of kidney     Depression     MR (mental retardation)       History reviewed. No pertinent surgical history. No Known Allergies  Current Outpatient Prescriptions   Medication Sig Dispense Refill    CHACORTA, 28, 3-0.02 mg tab TAKE 1 TABLET BY MOUTH DAILY 84 Tab 3    naproxen sodium (ALEVE) 220 mg tablet Take 220 mg by mouth two (2) times daily (with meals).  DIPHENHYDRAMINE HCL (BENADRYL ALLERGY PO) Take  by mouth.  ARIPiprazole (ABILIFY) 15 mg tablet Take 15 mg by mouth daily.  topiramate (TOPAMAX) 100 mg tablet Take  by mouth daily.  buPROPion SR (WELLBUTRIN SR) 150 mg SR tablet Take 150 mg by mouth daily.        Family History   Problem Relation Age of Onset    Depression Mother     Cancer Father      colon     Social History   Substance Use Topics    Smoking status: Never Smoker    Smokeless tobacco: Never Used    Alcohol use No       Specialists/Care Team   Osiel Stone has established care with the following healthcare providers:  -Dr. Gabino Child ( psychiatrist) - usually sees him every 3 months, who is managing her depression.  -Behavior support group    Health Risk Assessment     Demographics   female  39 y.o. Gena 43 Questions   -During the past 4 weeks:   -how would you rate your health in general? Good   -how often have you been bothered by feeling dizzy when standing up? never   -how much have you been bothered by bodily pain? not   -Have you noticed any hearing difficulties? no   -has your physical and emotional health limited your social activities with family or friends? no    Emotional Health Questions   -Do you have a history of depression, anxiety, or emotional problems? no  -Over the past 2 weeks, have you felt down, depressed or hopeless? no  -Over the past 2 weeks, have you felt little interest or pleasure in doing things? no    Health Habits   Please describe your diet habits: Good, per mother the patient eats plenty of home made food, plenty of salads and veggies. Do you get 5 servings of fruits or vegetables daily? yes  Do you exercise regularly? yes    Activities of Daily Living and Functional Status   -Do you need help with eating, walking, dressing, bathing, toileting, the phone, transportation, shopping, preparing meals, housework, laundry, medications or managing money? no  -In the past four weeks, was someone available to help you if you needed and wanted help with anything? yes  -Are you confident are you that you can control and manage most of your health problems? yes  -Have you been given information to help you keep track of your medications? yes  -How often do you have trouble taking your medications as prescribed? never    Fall Risk and Home Safety   Have you fallen 2 or more times in the past year?  no  Does your home have rugs in the hallway, lack grab bars in the bathroom, lack handrails on the stairs or have poor lighting? no  Do you have smoke detectors and check them regularly? yes  Do you have difficulties driving a car? Does not drive, the mom usually does  Do you always fasten your seat belt when you are in a car? yes    Review of Systems (if indicated for problems addressed today)   N/A    Physical Examination     Vitals:    09/21/18 0834   BP: 117/77   Pulse: 81   Resp: 16   Temp: 98.2 °F (36.8 °C)   TempSrc: Oral   SpO2: 100%   Weight: 172 lb 12.8 oz (78.4 kg)   Height: 5' 5\" (1.651 m)     Body mass index is 28.76 kg/(m^2). No exam data present  Was the patient's timed Up & Go test unsteady or longer than 30 seconds? no    Evaluation of Cognitive Function   Mood/affect:  neutral  Orientation: Person, Place, Time and Situation  Appearance: age appropriate  Family member/caregiver input: The mother is a primary care taker due to patient's mental retardation. THe mom helps to manage medications and doctor's appointments    Additional exam if indicated for problems addressed today:  Physical Exam   Constitutional: She is well-developed, well-nourished, and in no distress. HENT:   Head: Normocephalic and atraumatic. Cardiovascular: Normal rate and regular rhythm. No murmur heard. Pulmonary/Chest: Effort normal. No respiratory distress. She has no wheezes.        Advice/Referrals/Counseling (as indicated)   Education and counseling provided for any problems identified above: N/A    Preventive Services     (Preventive care checklist to be included in patient instructions)  Discussed today Done Previously Not Needed      X Pneumococcal vaccines   X   Flu vaccine     X Hepatitis B vaccine (if at risk)     X Shingles vaccine    X 11/29/2011  TDAP vaccine    X  4/2017  Mammogram    X (2/15/2017)  Pap smear     X Colorectal cancer screening     X Low-dose CT for lung cancer screening     X Bone density test     X Glaucoma screening   X   Cholesterol test   X   Diabetes screening test      X Diabetes self-management class     X Nutritionist referral for diabetes or renal disease     Discussion of Advance Directive   Discussed with Sammy Griffin her ability to prepare and advance directive in the case that an injury or illness causes her to be unable to make health care decisions. Not discussed today    Assessment/Plan   V70.0    ICD-10-CM ICD-9-CM    1. Medicare annual wellness visit, subsequent Z00.00 V70.0    2. Screening for breast cancer Z12.31 V76.10    3.  Encounter for immunization Z23 V03.89 INFLUENZA VIRUS VAC QUAD,SPLIT,PRESV FREE SYRINGE IM      MT IMMUNIZ ADMIN,1 SINGLE/COMB VAC/TOXOID       Orders Placed This Encounter    MT IMMUNIZ ADMIN,1 SINGLE/COMB VAC/TOXOID    INFLUENZA VIRUS VACCINE QUADRIVALENT, PRESERVATIVE FREE SYRINGE (79875)       Follow-up Disposition: Not on Dori Baca MD

## 2018-09-21 NOTE — MR AVS SNAPSHOT
[address] 
[phone] Patient: Desmond Alexander MRN: [id number] OLGA:[birth year] Visit Information Date & Time Provider Department Dept. Phone Encounter #  
 9/21/2018  8:35 AM Dolores Felix MD [facility] [facility] [phone] [id number] Upcoming Health Maintenance Date Due Influenza Age 5 to Adult 8/1/2018 MEDICARE YEARLY EXAM 9/19/2018 PAP AKA CERVICAL CYTOLOGY 2/15/2020 DTaP/Tdap/Td series (2 - Td) 11/29/2021 Allergies as of 9/21/2018  Review Complete On: 1/16/2018 By: Nahomi Cowart MD  
 No Known Allergies Current Immunizations  Reviewed on 9/18/2017 Name Date Influenza Vaccine 10/8/2013, 1/3/2013 Influenza Vaccine Claudy Push) 9/24/2016 Influenza Vaccine (Quad) PF  Incomplete, 9/18/2017 Influenza Vaccine Split 11/29/2011 PPD 1/3/2013, 11/29/2011, 11/2/2010 TB Skin Test (PPD) Intradermal 11/11/2015, 5/30/2014 TDAP Vaccine 11/29/2011 Not reviewed this visit You Were Diagnosed With   
  
 Codes Comments Medicare annual wellness visit, subsequent    -  Primary ICD-10-CM: Z00.00 ICD-9-CM: V70.0 Screening for breast cancer     ICD-10-CM: Z12.31 
ICD-9-CM: V76.10 Encounter for immunization     ICD-10-CM: I46 ICD-9-CM: V03.89 Vitals BP Pulse Temp Resp Height(growth percentile) Weight(growth percentile) 117/77 (BP 1 Location: Right arm, BP Patient Position: Sitting) 81 98.2 °F (36.8 °C) (Oral) 16 5' 5\" (1.651 m) 172 lb 12.8 oz (78.4 kg) SpO2 BMI OB Status Smoking Status 100% 28.76 kg/m2 Medically Induced Never Smoker Vitals History BMI and BSA Data Body Mass Index Body Surface Area 28.76 kg/m 2 1.9 m 2 Preferred Pharmacy Pharmacy Name Phone [facility] [place], [address] [place] AT [facility] [phone] Your Updated Medication List  
  
   
This list is accurate as of 9/21/18  8:56 AM.  Always use your most recent med list.  
  
  
  
  
 ABILIFY 15 mg tablet Generic drug:  ARIPiprazole Take 15 mg by mouth daily. ALEVE 220 mg tablet Generic drug:  naproxen sodium Take 220 mg by mouth two (2) times daily (with meals). BENADRYL ALLERGY PO Take  by mouth. buPROPion  mg SR tablet Commonly known as:  Lorena Shirts Take 150 mg by mouth daily. CHACORTA (28) 3-0.02 mg Tab Generic drug:  drospirenone-ethinyl estradiol TAKE 1 TABLET BY MOUTH DAILY topiramate 100 mg tablet Commonly known as:  TOPAMAX Take  by mouth daily. We Performed the Following CBC W/O DIFF [63941 CPT(R)] HEMOGLOBIN A1C WITH EAG [16504 CPT(R)] INFLUENZA VIRUS VAC QUAD,SPLIT,PRESV FREE SYRINGE IM [id number] CPT(R)] LIPID PANEL [92010 CPT(R)] METABOLIC PANEL, COMPREHENSIVE [99737 CPT(R)] IA IMMUNIZ ADMIN,1 SINGLE/COMB VAC/TOXOID [id number] CPT(R)] To-Do List   
 09/25/2018 Imaging:  CLIFF MAMMO BI SCREENING INCL CAD Patient Instructions Discussed today Done Previously Not Needed X Pneumococcal vaccines X   Flu vaccine X Hepatitis B vaccine (if at risk) X Shingles vaccine X 11/29/2011  TDAP vaccine X 
4/2017  Mammogram  
 X (2/15/2017)  Pap smear X Colorectal cancer screening X Low-dose CT for lung cancer screening X Bone density test  
  X Glaucoma screening X   Cholesterol test  
X   Diabetes screening test   
  X Diabetes self-management class X Nutritionist referral for diabetes or renal disease Well Visit, Ages 25 to 48: Care Instructions Your Care Instructions Physical exams can help you stay healthy. Your doctor has checked your overall health and may have suggested ways to take good care of yourself. He or she also may have recommended tests.  At home, you can help prevent illness with healthy eating, regular exercise, and other steps. Follow-up care is a key part of your treatment and safety. Be sure to make and go to all appointments, and call your doctor if you are having problems. It's also a good idea to know your test results and keep a list of the medicines you take. How can you care for yourself at home? · Reach and stay at a healthy weight. This will lower your risk for many problems, such as obesity, diabetes, heart disease, and high blood pressure. · Get at least 30 minutes of physical activity on most days of the week. Walking is a good choice. You also may want to do other activities, such as running, swimming, cycling, or playing tennis or team sports. Discuss any changes in your exercise program with your doctor. · Do not smoke or allow others to smoke around you. If you need help quitting, talk to your doctor about stop-smoking programs and medicines. These can increase your chances of quitting for good. · Talk to your doctor about whether you have any risk factors for sexually transmitted infections (STIs). Having one sex partner (who does not have STIs and does not have sex with anyone else) is a good way to avoid these infections. · Use birth control if you do not want to have children at this time. Talk with your doctor about the choices available and what might be best for you. · Protect your skin from too much sun. When you're outdoors from 10 a.m. to 4 p.m., stay in the shade or cover up with clothing and a hat with a wide brim. Wear sunglasses that block UV rays. Even when it's cloudy, put broad-spectrum sunscreen (SPF 30 or higher) on any exposed skin. · See a dentist one or two times a year for checkups and to have your teeth cleaned. · Wear a seat belt in the car. · Drink alcohol in moderation, if at all. That means no more than 2 drinks a day for men and 1 drink a day for women. Follow your doctor's advice about when to have certain tests. These tests can spot problems early. For everyone · Cholesterol. Have the fat (cholesterol) in your blood tested after age 21. Your doctor will tell you how often to have this done based on your age, family history, or other things that can increase your risk for heart disease. · Blood pressure. Have your blood pressure checked during a routine doctor visit. Your doctor will tell you how often to check your blood pressure based on your age, your blood pressure results, and other factors. · Vision. Talk with your doctor about how often to have a glaucoma test. 
· Diabetes. Ask your doctor whether you should have tests for diabetes. · Colon cancer. Have a test for colon cancer at age 48. You may have one of several tests. If you are younger than 48, you may need a test earlier if you have any risk factors. Risk factors include whether you already had a precancerous polyp removed from your colon or whether your parent, brother, sister, or child has had colon cancer. For women · Breast exam and mammogram. Talk to your doctor about when you should have a clinical breast exam and a mammogram. Medical experts differ on whether and how often women under 50 should have these tests. Your doctor can help you decide what is right for you. · Pap test and pelvic exam. Begin Pap tests at age 24. A Pap test is the best way to find cervical cancer. The test often is part of a pelvic exam. Ask how often to have this test. 
· Tests for sexually transmitted infections (STIs). Ask whether you should have tests for STIs. You may be at risk if you have sex with more than one person, especially if your partners do not wear condoms. For men · Tests for sexually transmitted infections (STIs). Ask whether you should have tests for STIs. You may be at risk if you have sex with more than one person, especially if you do not wear a condom. · Testicular cancer exam. Ask your doctor whether you should check your testicles regularly. · Prostate exam. Talk to your doctor about whether you should have a blood test (called a PSA test) for prostate cancer. Experts differ on whether and when men should have this test. Some experts suggest it if you are older than 39 and are -American or have a father or brother who got prostate cancer when he was younger than 72. When should you call for help? Watch closely for changes in your health, and be sure to contact your doctor if you have any problems or symptoms that concern you. Where can you learn more? Go to http://philly-greer.info/. Enter P072 in the search box to learn more about \"Well Visit, Ages 25 to 48: Care Instructions. \" Current as of: May 16, 2017 Content Version: 11.7 © 2866-6977 [employer]. Care instructions adapted under license by [employer] (which disclaims liability or warranty for this information). If you have questions about a medical condition or this instruction, always ask your healthcare professional. [place] [address] any warranty or liability for your use of this information. Introducing [facility]! Stephan Desai introduces [employer] patient portal. Now you can access parts of your medical record, email your doctor's office, and request medication refills online. 1. In your internet browser, go to [email or web address] 2. Click on the First Time User? Click Here link in the Sign In box. You will see the New Member Sign Up page. 3. Enter your [employer] Access Code exactly as it appears below. You will not need to use this code after youve completed the sign-up process. If you do not sign up before the expiration date, you must request a new code. · [employer] Access Code: [email or web address] Expires: 12/20/2018  8:56 AM 
 
 4. Enter the last four digits of your Social Security Number (xxxx) and Date of Birth (mm/dd/yyyy) as indicated and click Submit. You will be taken to the next sign-up page. 5. Create a Teleborder ID. This will be your Teleborder login ID and cannot be changed, so think of one that is secure and easy to remember. 6. Create a Teleborder password. You can change your password at any time. 7. Enter your Password Reset Question and Answer. This can be used at a later time if you forget your password. 8. Enter your e-mail address. You will receive e-mail notification when new information is available in [address] 9. Click Sign Up. You can now view and download portions of your medical record. 10. Click the Download Summary menu link to download a portable copy of your medical information. If you have questions, please visit the Frequently Asked Questions section of the Teleborder website. Remember, Teleborder is NOT to be used for urgent needs. For medical emergencies, dial 911. Now available from your iPhone and Android! Please provide this summary of care documentation to your next provider. Your primary care clinician is listed as [address] [place] [address]. If you have any questions after today's visit, please call [phone].

## 2018-09-22 LAB
ALBUMIN SERPL-MCNC: 4.2 G/DL (ref 3.5–5.5)
ALBUMIN/GLOB SERPL: 1.8 {RATIO} (ref 1.2–2.2)
ALP SERPL-CCNC: 77 IU/L (ref 39–117)
ALT SERPL-CCNC: 13 IU/L (ref 0–32)
AST SERPL-CCNC: 15 IU/L (ref 0–40)
BILIRUB SERPL-MCNC: 0.3 MG/DL (ref 0–1.2)
BUN SERPL-MCNC: 15 MG/DL (ref 6–24)
BUN/CREAT SERPL: 29 (ref 9–23)
CALCIUM SERPL-MCNC: 8.9 MG/DL (ref 8.7–10.2)
CHLORIDE SERPL-SCNC: 104 MMOL/L (ref 96–106)
CHOLEST SERPL-MCNC: 180 MG/DL (ref 100–199)
CO2 SERPL-SCNC: 17 MMOL/L (ref 20–29)
CREAT SERPL-MCNC: 0.52 MG/DL (ref 0.57–1)
ERYTHROCYTE [DISTWIDTH] IN BLOOD BY AUTOMATED COUNT: 12.7 % (ref 12.3–15.4)
EST. AVERAGE GLUCOSE BLD GHB EST-MCNC: 97 MG/DL
GLOBULIN SER CALC-MCNC: 2.3 G/DL (ref 1.5–4.5)
GLUCOSE SERPL-MCNC: 87 MG/DL (ref 65–99)
HBA1C MFR BLD: 5 % (ref 4.8–5.6)
HCT VFR BLD AUTO: 44.1 % (ref 34–46.6)
HDLC SERPL-MCNC: 68 MG/DL
HGB BLD-MCNC: 14.7 G/DL (ref 11.1–15.9)
INTERPRETATION, 910389: NORMAL
LDLC SERPL CALC-MCNC: 96 MG/DL (ref 0–99)
MCH RBC QN AUTO: 31.5 PG (ref 26.6–33)
MCHC RBC AUTO-ENTMCNC: 33.3 G/DL (ref 31.5–35.7)
MCV RBC AUTO: 95 FL (ref 79–97)
PLATELET # BLD AUTO: 166 X10E3/UL (ref 150–379)
POTASSIUM SERPL-SCNC: 4.1 MMOL/L (ref 3.5–5.2)
PROT SERPL-MCNC: 6.5 G/DL (ref 6–8.5)
RBC # BLD AUTO: 4.66 X10E6/UL (ref 3.77–5.28)
SODIUM SERPL-SCNC: 138 MMOL/L (ref 134–144)
TRIGL SERPL-MCNC: 82 MG/DL (ref 0–149)
VLDLC SERPL CALC-MCNC: 16 MG/DL (ref 5–40)
WBC # BLD AUTO: 7.9 X10E3/UL (ref 3.4–10.8)

## 2018-09-22 NOTE — PROGRESS NOTES
CBC WNL with Hg 14.7, CMP WNL, Lipid panel with TC of 180, TG 82, LDL 96 and HDL 68, not in statin benefit group. HgA1C is 5.0. Will call with the results.

## 2018-10-02 ENCOUNTER — HOSPITAL ENCOUNTER (OUTPATIENT)
Dept: MAMMOGRAPHY | Age: 43
Discharge: HOME | End: 2018-10-02
Attending: FAMILY MEDICINE
Payer: MEDICARE

## 2018-10-02 DIAGNOSIS — Z12.39 SCREENING FOR BREAST CANCER: ICD-10-CM

## 2018-10-02 PROCEDURE — 77067 SCR MAMMO BI INCL CAD: CPT

## 2019-01-23 ENCOUNTER — OFFICE VISIT (OUTPATIENT)
Dept: FAMILY MEDICINE CLINIC | Age: 44
End: 2019-01-23

## 2019-01-23 VITALS
HEIGHT: 65 IN | BODY MASS INDEX: 29.82 KG/M2 | DIASTOLIC BLOOD PRESSURE: 67 MMHG | OXYGEN SATURATION: 98 % | WEIGHT: 179 LBS | SYSTOLIC BLOOD PRESSURE: 106 MMHG | TEMPERATURE: 96.8 F | HEART RATE: 94 BPM | RESPIRATION RATE: 16 BRPM

## 2019-01-23 DIAGNOSIS — R04.0 EPISTAXIS: Primary | ICD-10-CM

## 2019-01-23 NOTE — PATIENT INSTRUCTIONS
Ayr nasal saline gel     Afrin nasal spray. If bleeding continues after 10 minutes of pressure, use decongestant nose drops such as Afrin - 2 sprays in each nostril every 10 minutes up to 3 times. Nosebleeds: Care Instructions  Your Care Instructions    Nosebleeds are common, especially if you have colds or allergies. Many things can cause a nosebleed. Some nosebleeds stop on their own with pressure. Others need packing. Some get cauterized (sealed). If you have gauze or other packing materials in your nose, you will need to follow up with your doctor to have the packing removed. You may need more treatment if you get nosebleeds a lot. The doctor has checked you carefully, but problems can develop later. If you notice any problems or new symptoms, get medical treatment right away. Follow-up care is a key part of your treatment and safety. Be sure to make and go to all appointments, and call your doctor if you are having problems. It's also a good idea to know your test results and keep a list of the medicines you take. How can you care for yourself at home? · If you get another nosebleed:  ? Sit up and tilt your head slightly forward. This keeps blood from going down your throat. ? Use your thumb and index finger to pinch your nose shut for 10 minutes. Use a clock. Do not check to see if the bleeding has stopped before the 10 minutes are up. If the bleeding has not stopped, pinch your nose shut for another 10 minutes. ? When the bleeding has stopped, try not to pick, rub, or blow your nose for 12 hours. Avoiding these things helps keep your nose from bleeding again. · If your doctor prescribed antibiotics, take them as directed. Do not stop taking them just because you feel better. You need to take the full course of antibiotics. To prevent nosebleeds  · Do not blow your nose too hard. · Try not to lift or strain after a nosebleed. · Raise your head on a pillow while you sleep.   · Put a thin layer of a saline- or water-based nasal gel, such as NasoGel, inside your nose. Put it on the septum, which divides your nostrils. This will prevent dryness that can cause nosebleeds. · Use a vaporizer or humidifier to add moisture to your bedroom. Follow the directions for cleaning the machine. · Do not use aspirin, ibuprofen (Advil, Motrin), or naproxen (Aleve) for 36 to 48 hours after a nosebleed unless your doctor tells you to. You can use acetaminophen (Tylenol) for pain relief. · Talk to your doctor about stopping any other medicines you are taking. Some medicines may make you more likely to get a nosebleed. · Do not use cold medicines or nasal sprays without first talking to your doctor. They can make your nose dry. When should you call for help? Call 911 anytime you think you may need emergency care. For example, call if:    · You passed out (lost consciousness).    Call your doctor now or seek immediate medical care if:    · You get another nosebleed and your nose is still bleeding after you have applied pressure 3 times for 10 minutes each time (30 minutes total).     · There is a lot of blood running down the back of your throat even after you pinch your nose and tilt your head forward.     · You have a fever.     · You have sinus pain.    Watch closely for changes in your health, and be sure to contact your doctor if:    · You get nosebleeds often, even if they stop.     · You do not get better as expected. Where can you learn more? Go to http://philly-greer.info/. Enter S156 in the search box to learn more about \"Nosebleeds: Care Instructions. \"  Current as of: September 23, 2018  Content Version: 11.9  © 6417-5644 [employer]. Care instructions adapted under license by [employer] (which disclaims liability or warranty for this information).  If you have questions about a medical condition or this instruction, always ask your healthcare professional. [employer] disclaims any warranty or liability for your use of this information.

## 2019-01-23 NOTE — PROGRESS NOTES
Chief Complaint   Patient presents with    Epistaxis     times 3 in 24 hrs     1. Have you been to the ER, urgent care clinic since your last visit? Hospitalized since your last visit? No    2. Have you seen or consulted any other health care providers outside of the [address] [place] [address] since your last visit? Include any pap smears or colon screening.  No

## 2019-01-23 NOTE — PROGRESS NOTES
HPI     CC: bloody nose     Citlali Oquendo is a 39 y.o. female with MR, bipolar, and depression who presents for epistaxis    Has hx of epistaxis. 3 over the past 24 hours. Has been using allergy nasal spray, which mom thinks could have aggravated it. Denies trauma to nose; does not pick her nose. Last episode was 2:30 AM. Just started using humidifier last night. Stays well hydrated and drinking fluids. Was sick last week with viral URI and was blowing her nose a lot. Previously with epistaxis that was cauterized using silver nitrate at ENT several years ago. PMHx - Reviewed  Past Medical History:   Diagnosis Date    Bipolar 1 disorder ([facility] [address]     Calculus of kidney     Depression     MR (mental retardation)        Meds - Reviewed  Current Outpatient Medications   Medication Sig Dispense Refill    CHACORTA, 28, 3-0.02 mg tab TAKE 1 TABLET BY MOUTH DAILY 84 Tab 3    naproxen sodium (ALEVE) 220 mg tablet Take 220 mg by mouth two (2) times daily (with meals).  DIPHENHYDRAMINE HCL (BENADRYL ALLERGY PO) Take  by mouth.  ARIPiprazole (ABILIFY) 15 mg tablet Take 15 mg by mouth daily.  topiramate (TOPAMAX) 100 mg tablet Take  by mouth daily.  buPROPion SR (WELLBUTRIN SR) 150 mg SR tablet Take 150 mg by mouth daily. Allergies - Reviewed  No Known Allergies    Smoker - Reviewed  Social History     Tobacco Use   Smoking Status Never Smoker   Smokeless Tobacco Never Used       ETOH - Reviewed  Social History     Substance and Sexual Activity   Alcohol Use No       FH - Reviewed  Family History   Problem Relation Age of Onset    Depression Mother     Cancer Father         colon       ROS:  Review of Systems   Constitutional: Negative. Negative for activity change, appetite change, chills, diaphoresis and fatigue. HENT: Positive for nosebleeds. Negative for congestion, postnasal drip, rhinorrhea, sinus pressure and sinus pain. Respiratory: Negative for shortness of breath. Cardiovascular: Negative for chest pain. Neurological: Negative for dizziness, light-headedness and headaches. Physical Exam:  Visit Vitals  /67   Pulse 94   Temp 96.8 °F (36 °C) (Oral)   Resp 16   Ht 5' 5\" (1.651 m)   Wt 179 lb (81.2 kg)   SpO2 98%   BMI 29.79 kg/m²       Wt Readings from Last 3 Encounters:   01/23/19 179 lb (81.2 kg)   09/21/18 172 lb 12.8 oz (78.4 kg)   01/16/18 189 lb 12.8 oz (86.1 kg)     BP Readings from Last 3 Encounters:   01/23/19 106/67   09/21/18 117/77   01/16/18 116/73        Physical Exam   Constitutional: She is oriented to person, place, and time. She appears well-developed and well-nourished. No distress. HENT:   NCAT. Oral mucosa moist. L nostril with dried blood, but no active bleeding; unable to clearly observe site of bleed. R nostril normal. Posterior oropharynx without erythema. TM normal bilaterally. Eyes: Conjunctivae are normal. No scleral icterus. Cardiovascular: Normal rate and regular rhythm. Pulmonary/Chest: Effort normal and breath sounds normal. No respiratory distress. She has no wheezes. Neurological: She is alert and oriented to person, place, and time. She exhibits normal muscle tone. Coordination normal.   Skin: Capillary refill takes less than 2 seconds. She is not diaphoretic. Psychiatric: She has a normal mood and affect. Her behavior is normal. Judgment and thought content normal.   Nursing note and vitals reviewed. Assessment     39 y.o. female with MR presents with epistaxis  Patient Active Problem List   Diagnosis Code    Metrorrhagia N92.1    Depression F32.9    Mental retardation F79    Cellulitis L03.90       Today's diagnoses are:    ICD-10-CM ICD-9-CM    1. Epistaxis R04.0 784.7               Plan     1. Epistaxis - stable, no active bleed   - encourage PO fluids and hydration.  Encouraged continued use of humidifier  - trial of Ayr nasal saline gel until epistaxis resolves  - trial of Afrin PRN if epistaxis occurs     Follow up as needed    Prior labs and imaging were reviewed. I have discussed the diagnosis with the patient and the intended plan as seen in the above orders. The patient has received an after-visit summary and questions were answered concerning future plans. I have discussed medication side effects and warnings with the patient as well. Patient discussed with Dr. Samia Gordon, Attending Physician.     Matt Maddox MD, PGY3  Family Medicine Resident

## 2019-03-25 ENCOUNTER — TELEPHONE (OUTPATIENT)
Dept: FAMILY MEDICINE CLINIC | Age: 44
End: 2019-03-25

## 2019-03-25 NOTE — TELEPHONE ENCOUNTER
----- Message from Valentino Wright sent at 3/25/2019  8:25 AM EDT -----  Regarding: Dr. Vance Gilford pt's mother, is returning a call back to the practice [phone].

## 2019-03-25 NOTE — TELEPHONE ENCOUNTER
Returned the call and spoke with Braulio Wright. Said she received an automated call of a health reminder for the patient. She also said as far as she knows, the patient is fine at this time.

## 2019-03-29 DIAGNOSIS — Z01.419 WELL WOMAN EXAM: ICD-10-CM

## 2019-03-29 RX ORDER — DROSPIRENONE AND ETHINYL ESTRADIOL 0.02-3(28)
KIT ORAL
Qty: 84 TAB | Refills: 3 | Status: SHIPPED | OUTPATIENT
Start: 2019-03-29 | End: 2020-02-22

## 2019-04-04 ENCOUNTER — OFFICE VISIT (OUTPATIENT)
Dept: FAMILY MEDICINE CLINIC | Age: 44
End: 2019-04-04

## 2019-04-04 VITALS
RESPIRATION RATE: 16 BRPM | DIASTOLIC BLOOD PRESSURE: 64 MMHG | WEIGHT: 180 LBS | TEMPERATURE: 96 F | OXYGEN SATURATION: 100 % | HEART RATE: 76 BPM | HEIGHT: 65 IN | SYSTOLIC BLOOD PRESSURE: 108 MMHG | BODY MASS INDEX: 29.99 KG/M2

## 2019-04-04 DIAGNOSIS — F31.9 BIPOLAR 1 DISORDER (HCC): Primary | ICD-10-CM

## 2019-04-04 DIAGNOSIS — F79 INTELLECTUAL DISABILITY: ICD-10-CM

## 2019-04-04 NOTE — PROGRESS NOTES
Chief Complaint   Patient presents with    Form Completion     forms for new apartment     1. Have you been to the ER, urgent care clinic since your last visit? Hospitalized since your last visit? No    2. Have you seen or consulted any other health care providers outside of the [address] [place] [address] since your last visit? Include any pap smears or colon screening.  No

## 2019-04-04 NOTE — LETTER
Name: Mamie Oseguera   Sex: female   : 1975  
[address] [facility] [address] [address] 
[phone] (home) [phone] (work) Current Immunizations: 
Immunization History Administered Date(s) Administered  Influenza Vaccine 2013, 10/08/2013  Influenza Vaccine Arletha Judy) 2016  Influenza Vaccine (Quad) PF 2017, 2018  Influenza Vaccine Split 2011  PPD 2010, 2011, 2013  TB Skin Test (PPD) Intradermal 2014, 2015  TDAP Vaccine 2011 Allergies: Allergies as of 2019  (No Known Allergies)

## 2019-04-04 NOTE — PROGRESS NOTES
HPI     CC: placement form     Maira Mckee is a 39 y.o. female with intellectual disability and Bipolar 1 who presents for placement form. Pt will be moving into an apartment and requires form completion. Per mother, apartment complex is only for those with a high functioning disability that do not require constant supervision. Pt states she is doing well today and has no complaints. Bipolar is stable; on abilify, topamax, and wellbutrin     PMHx - Reviewed  Past Medical History:   Diagnosis Date    Bipolar 1 disorder ([facility] [address]     Calculus of kidney     Depression     Intellectual disability        Meds - Reviewed  Current Outpatient Medications   Medication Sig Dispense Refill    drospirenone-ethinyl estradiol (CHACORTA, 28,) 3-0.02 mg tab TAKE 1 TABLET BY MOUTH DAILY 84 Tab 3    naproxen sodium (ALEVE) 220 mg tablet Take 220 mg by mouth two (2) times daily (with meals).  ARIPiprazole (ABILIFY) 15 mg tablet Take 15 mg by mouth daily.  topiramate (TOPAMAX) 100 mg tablet Take  by mouth daily.  buPROPion SR (WELLBUTRIN SR) 150 mg SR tablet Take 150 mg by mouth daily.  DIPHENHYDRAMINE HCL (BENADRYL ALLERGY PO) Take  by mouth. Allergies - Reviewed  No Known Allergies    Smoker - Reviewed  Social History     Tobacco Use   Smoking Status Never Smoker   Smokeless Tobacco Never Used       ETOH - Reviewed  Social History     Substance and Sexual Activity   Alcohol Use No       FH - Reviewed  Family History   Problem Relation Age of Onset    Depression Mother     Cancer Father         colon       ROS:  Review of Systems   Constitutional: Negative. Negative for fatigue. Respiratory: Negative. Negative for chest tightness and shortness of breath. Cardiovascular: Negative. Negative for chest pain. Gastrointestinal: Negative. Negative for abdominal pain, nausea and vomiting. Neurological: Negative. Negative for dizziness, light-headedness and headaches.        Physical Exam:  Visit Vitals  /64   Pulse 76   Temp 96 °F (35.6 °C)   Resp 16   Ht 5' 5\" (1.651 m)   Wt 180 lb (81.6 kg)   SpO2 100%   BMI 29.95 kg/m²       Wt Readings from Last 3 Encounters:   04/04/19 180 lb (81.6 kg)   01/23/19 179 lb (81.2 kg)   09/21/18 172 lb 12.8 oz (78.4 kg)     BP Readings from Last 3 Encounters:   04/05/19 114/74   04/04/19 108/64   01/23/19 106/67        Physical Exam   Constitutional: She is oriented to person, place, and time. She appears well-developed and well-nourished. No distress. HENT:   Head: Normocephalic and atraumatic. Mouth/Throat: Oropharynx is clear and moist.   Cardiovascular: Normal rate and regular rhythm. Pulmonary/Chest: Effort normal and breath sounds normal. She has no wheezes. She has no rales. Neurological: She is alert and oriented to person, place, and time. She exhibits normal muscle tone. Coordination normal.   Skin: Skin is warm and dry. She is not diaphoretic. Psychiatric: She has a normal mood and affect. Her behavior is normal. Judgment and thought content normal.   Nursing note and vitals reviewed. Assessment     39 y.o. female with MR and Bipolar 1 presents with:  Patient Active Problem List   Diagnosis Code    Metrorrhagia N92.1    Depression F32.9    Mental retardation F79    Cellulitis L03.90    [facility] F31.9       Today's diagnoses are:    ICD-10-CM ICD-9-CM    1. Bipolar 1 disorder (HCC) F31.9 296.7    2. Intellectual disability F79 319               Plan     1. Bipolar 1, ID  - here for placement forms. Will return to mother after TB testing complete  - scheduled to return for TB testing tomorrow         Prior labs and imaging were reviewed. I have discussed the diagnosis with the patient and the intended plan as seen in the above orders. The patient has received an after-visit summary and questions were answered concerning future plans.   I have discussed medication side effects and warnings with the patient as well. Patient discussed with Dr. Maris Morales, Attending Physician.     Maureen Long MD, PGY3  Family Medicine Resident

## 2019-04-05 ENCOUNTER — CLINICAL SUPPORT (OUTPATIENT)
Dept: FAMILY MEDICINE CLINIC | Age: 44
End: 2019-04-05

## 2019-04-05 VITALS
TEMPERATURE: 98.2 F | HEART RATE: 77 BPM | OXYGEN SATURATION: 98 % | DIASTOLIC BLOOD PRESSURE: 74 MMHG | SYSTOLIC BLOOD PRESSURE: 114 MMHG

## 2019-04-05 DIAGNOSIS — Z23 ENCOUNTER FOR IMMUNIZATION: ICD-10-CM

## 2019-04-05 DIAGNOSIS — Z11.1 SCREENING EXAMINATION FOR PULMONARY TUBERCULOSIS: ICD-10-CM

## 2019-04-05 NOTE — PROGRESS NOTES
Per Dr. Loli Erwin PPD  Patient received PPD in [facility]. Will return in 48-72 hours for reading. Verbalized understanding the plan.

## 2019-04-06 PROBLEM — F31.9 BIPOLAR 1 DISORDER (HCC): Status: ACTIVE | Noted: 2019-04-06

## 2019-04-08 ENCOUNTER — CLINICAL SUPPORT (OUTPATIENT)
Dept: FAMILY MEDICINE CLINIC | Age: 44
End: 2019-04-08

## 2019-04-08 VITALS
DIASTOLIC BLOOD PRESSURE: 63 MMHG | HEIGHT: 65 IN | WEIGHT: 183.6 LBS | RESPIRATION RATE: 16 BRPM | OXYGEN SATURATION: 96 % | BODY MASS INDEX: 30.59 KG/M2 | SYSTOLIC BLOOD PRESSURE: 101 MMHG | HEART RATE: 89 BPM | TEMPERATURE: 98.7 F

## 2019-04-08 DIAGNOSIS — Z11.1 ENCOUNTER FOR PPD SKIN TEST READING: Primary | ICD-10-CM

## 2019-04-08 LAB
MM INDURATION POC: 0 MM (ref 0–5)
PPD POC: NEGATIVE NEGATIVE

## 2019-04-08 NOTE — PROGRESS NOTES
Chief Complaint   Patient presents with    PPD Reading     1. Have you been to the ER, urgent care clinic since your last visit? Hospitalized since your last visit? No    2. Have you seen or consulted any other health care providers outside of the [address] [place] [address] since your last visit? Include any pap smears or colon screening. No  Visit Vitals  /63 (BP 1 Location: Right arm, BP Patient Position: Sitting)   Pulse 89   Temp 98.7 °F (37.1 °C) (Oral)   Resp 16   Ht 5' 5\" (1.651 m)   Wt 183 lb 9.6 oz (83.3 kg)   SpO2 96%   BMI 30.55 kg/m²     PPD Reading Note  PPD read and results entered in [facility]. Result: 0 mm induration.   Interpretation: negative  If test not read within 48-72 hours of initial placement, patient advised to repeat in other arm 1-3 weeks after this test.  Allergic reaction: no

## 2019-07-02 ENCOUNTER — DOCUMENTATION ONLY (OUTPATIENT)
Dept: INTERNAL MEDICINE CLINIC | Age: 44
End: 2019-07-02

## 2019-10-01 ENCOUNTER — TELEPHONE (OUTPATIENT)
Dept: FAMILY MEDICINE CLINIC | Age: 44
End: 2019-10-01

## 2019-10-01 NOTE — TELEPHONE ENCOUNTER
Returned call, left message to call office. We have a list of psychiatrist, you can also call the insurance company to see who is covered under your plan.

## 2019-10-01 NOTE — TELEPHONE ENCOUNTER
Favio Emery /mother on hippa      Asking if you can recommend a psychiatrist for her daughter to see whom is mentally challenged and bipolar.     Call [phone]

## 2019-12-19 ENCOUNTER — OFFICE VISIT (OUTPATIENT)
Dept: FAMILY MEDICINE CLINIC | Age: 44
End: 2019-12-19

## 2019-12-19 VITALS
OXYGEN SATURATION: 100 % | SYSTOLIC BLOOD PRESSURE: 111 MMHG | HEART RATE: 91 BPM | TEMPERATURE: 98.2 F | BODY MASS INDEX: 31.82 KG/M2 | HEIGHT: 65 IN | WEIGHT: 191 LBS | RESPIRATION RATE: 16 BRPM | DIASTOLIC BLOOD PRESSURE: 76 MMHG

## 2019-12-19 DIAGNOSIS — Z79.899 ENCOUNTER FOR LONG-TERM (CURRENT) USE OF OTHER MEDICATIONS: ICD-10-CM

## 2019-12-19 DIAGNOSIS — Z12.39 BREAST CANCER SCREENING: ICD-10-CM

## 2019-12-19 DIAGNOSIS — F31.9 BIPOLAR 1 DISORDER (HCC): ICD-10-CM

## 2019-12-19 DIAGNOSIS — Z00.00 MEDICARE ANNUAL WELLNESS VISIT, SUBSEQUENT: Primary | ICD-10-CM

## 2019-12-19 NOTE — PROGRESS NOTES
1. Have you been to the ER, urgent care clinic since your last visit? Hospitalized since your last visit? No    2. Have you seen or consulted any other health care providers outside of the [address] [place] [address] since your last visit? Include any pap smears or colon screening. No    Chief Complaint   Patient presents with    Annual Wellness Visit       Blood pressure 111/76, pulse 91, temperature 98.2 °F (36.8 °C), temperature source Oral, resp. rate 16, height 5' 5\" (1.651 m), weight 191 lb (86.6 kg), SpO2 100 %. General Health Questions   -During the past 4 weeks:   -how would you rate your health in general? Good   -how often have you been bothered by feeling dizzy when standing up? No   -how much have you been bothered by bodily pain? not   -Have you noticed any hearing difficulties? no   -has your physical and emotional health limited your social activities with family or friends? no    Emotional Health Questions   -Do you have a history of depression, anxiety, or emotional problems? no  -Over the past 2 weeks, have you felt down, depressed or hopeless? no  -Over the past 2 weeks, have you felt little interest or pleasure in doing things? no    Health Habits   Please describe your diet habits: going on Caitlyn Saliva  Do you get 5 servings of fruits or vegetables daily? yes  Do you exercise regularly? yes    Activities of Daily Living and Functional Status   -Do you need help with eating, walking, dressing, bathing, toileting, the phone, transportation, shopping, preparing meals, housework, laundry, medications or managing money? yes  -In the past four weeks, was someone available to help you if you needed and wanted help with anything? yes  -Are you confident are you that you can control and manage most of your health problems? yes  -Have you been given information to help you keep track of your medications?  yes  -How often do you have trouble taking your medications as prescribed? never    Fall Risk and Home Safety   Have you fallen 2 or more times in the past year? no  Does your home have rugs in the hallways? no, Do you have grab bars in the bathrooms? yes, Does your home have handrails on the stairs? yes, Do you have adequate lighting in your home? yes  Do you have smoke detectors and check them regularly? yes  Do you have difficulties driving a car/vehicle?  Do not drive  Do you always fasten your seat belt when you are in a car? yes

## 2019-12-20 ENCOUNTER — HOSPITAL ENCOUNTER (OUTPATIENT)
Dept: LAB | Age: 44
Discharge: HOME | End: 2019-12-20

## 2019-12-20 ENCOUNTER — LAB ONLY (OUTPATIENT)
Dept: FAMILY MEDICINE CLINIC | Age: 44
End: 2019-12-20

## 2019-12-20 DIAGNOSIS — Z79.899 ENCOUNTER FOR LONG-TERM (CURRENT) USE OF OTHER MEDICATIONS: ICD-10-CM

## 2019-12-20 DIAGNOSIS — Z12.39 BREAST CANCER SCREENING: ICD-10-CM

## 2019-12-20 DIAGNOSIS — F31.9 BIPOLAR 1 DISORDER (HCC): ICD-10-CM

## 2019-12-20 LAB
ALBUMIN SERPL-MCNC: 3.5 G/DL (ref 3.5–5)
ALBUMIN/GLOB SERPL: 1.2 {RATIO} (ref 1.1–2.2)
ALP SERPL-CCNC: 78 U/L (ref 45–117)
ALT SERPL-CCNC: 22 U/L (ref 12–78)
ANION GAP SERPL CALC-SCNC: 8 MMOL/L (ref 5–15)
AST SERPL-CCNC: 14 U/L (ref 15–37)
BILIRUB SERPL-MCNC: 0.3 MG/DL (ref 0.2–1)
BUN SERPL-MCNC: 14 MG/DL (ref 6–20)
BUN/CREAT SERPL: 23 (ref 12–20)
CALCIUM SERPL-MCNC: 8.3 MG/DL (ref 8.5–10.1)
CHLORIDE SERPL-SCNC: 109 MMOL/L (ref 97–108)
CHOLEST SERPL-MCNC: 216 MG/DL
CO2 SERPL-SCNC: 23 MMOL/L (ref 21–32)
COMMENT, HOLDF: NORMAL
CREAT SERPL-MCNC: 0.6 MG/DL (ref 0.55–1.02)
ERYTHROCYTE [DISTWIDTH] IN BLOOD BY AUTOMATED COUNT: 12.2 % (ref 11.5–14.5)
EST. AVERAGE GLUCOSE BLD GHB EST-MCNC: 94 MG/DL
GLOBULIN SER CALC-MCNC: 3 G/DL (ref 2–4)
GLUCOSE SERPL-MCNC: 79 MG/DL (ref 65–100)
HBA1C MFR BLD: 4.9 % (ref 4–5.6)
HCT VFR BLD AUTO: 44.2 % (ref 35–47)
HDLC SERPL-MCNC: 90 MG/DL
HDLC SERPL: 2.4 {RATIO} (ref 0–5)
HGB BLD-MCNC: 14.6 G/DL (ref 11.5–16)
LDLC SERPL CALC-MCNC: 109.2 MG/DL (ref 0–100)
LIPID PROFILE,FLP: ABNORMAL
MCH RBC QN AUTO: 31.7 PG (ref 26–34)
MCHC RBC AUTO-ENTMCNC: 33 G/DL (ref 30–36.5)
MCV RBC AUTO: 95.9 FL (ref 80–99)
NRBC # BLD: 0 K/UL (ref 0–0.01)
NRBC BLD-RTO: 0 PER 100 WBC
PLATELET # BLD AUTO: 155 K/UL (ref 150–400)
PMV BLD AUTO: 10.6 FL (ref 8.9–12.9)
POTASSIUM SERPL-SCNC: 3.9 MMOL/L (ref 3.5–5.1)
PROT SERPL-MCNC: 6.5 G/DL (ref 6.4–8.2)
RBC # BLD AUTO: 4.61 M/UL (ref 3.8–5.2)
SAMPLES BEING HELD,HOLD: NORMAL
SODIUM SERPL-SCNC: 140 MMOL/L (ref 136–145)
TRIGL SERPL-MCNC: 84 MG/DL (ref ?–150)
TSH SERPL DL<=0.05 MIU/L-ACNC: 2.38 UIU/ML (ref 0.36–3.74)
VLDLC SERPL CALC-MCNC: 16.8 MG/DL
WBC # BLD AUTO: 6.6 K/UL (ref 3.6–11)

## 2019-12-22 PROBLEM — L03.90 CELLULITIS: Status: RESOLVED | Noted: 2017-08-01 | Resolved: 2019-12-22

## 2019-12-23 NOTE — PROGRESS NOTES
Normal HgbA1C -- glucose control    Normal thyroid function    Normal kidney function    Normal liver function tests     Calcium level slightly decreased    Cholesterol level is in acceptable range - 10 year of cardiovascular disease = 0.3% which is low    No anemia

## 2019-12-23 NOTE — PROGRESS NOTES
[address]  [address] [address] Vaibhav Blount   [phone]             Date of visit: 12/22/2019       This is an Subsequent Medicare Annual Wellness Visit (AWV). I have reviewed the patient's medical history in detail and updated the computerized patient record. Devang Arshad is a 38 y.o. female   History obtained from: the patient; her mother    Concerns today   - none  - plans to start Roanne Arnt tomorrow for weight control    History     Patient Active Problem List   Diagnosis Code    Metrorrhagia N92.1    Depression F32.9    Intellectual disability F79    Bipolar 1 disorder ([facility] [address] F31.9     Past Medical History:   Diagnosis Date    Bipolar 1 disorder ([facility] [address]     Calculus of kidney     Cellulitis 8/1/2017    Depression     Intellectual disability       No past surgical history on file. No Known Allergies  Current Outpatient Medications   Medication Sig Dispense Refill    drospirenone-ethinyl estradiol (CHACORTA, 28,) 3-0.02 mg tab TAKE 1 TABLET BY MOUTH DAILY 84 Tab 3    naproxen sodium (ALEVE) 220 mg tablet Take 220 mg by mouth as needed.  ARIPiprazole (ABILIFY) 15 mg tablet Take 15 mg by mouth daily.  topiramate (TOPAMAX) 100 mg tablet Take  by mouth daily.  buPROPion SR (WELLBUTRIN SR) 150 mg SR tablet Take 150 mg by mouth daily.  DIPHENHYDRAMINE HCL (BENADRYL ALLERGY PO) Take  by mouth.        Family History   Problem Relation Age of Onset    Depression Mother     Cancer Father         colon     Social History     Tobacco Use    Smoking status: Never Smoker    Smokeless tobacco: Never Used   Substance Use Topics    Alcohol use: No       Specialists/Care Team   Devang Arshad has established care with the following healthcare providers:  Patient Care Team:  Kayla Pozo MD as PCP - General  Kayla Pozo MD [facility] PCP [facility]  Edwar Rojas MD (Psychiatry)      Health Risk Assessment Demographics   female  38 y.o. General Health Questions    answers documented by Adriana Carlson LPN    Physical Examination     Vitals:    12/19/19 1133   BP: 111/76   Pulse: 91   Resp: 16   Temp: 98.2 °F (36.8 °C)   TempSrc: Oral   SpO2: 100%   Weight: 191 lb (86.6 kg)   Height: 5' 5\" (1.651 m)     Body mass index is 31.78 kg/m². Was the patient's timed Up & Go test unsteady or longer than 30 seconds? no    General -- awake, alert, cooperative  Neck -- supple, no significant adenopathy, no thyromegaly  Lungs -- clear bilaterally, symmetric movement  CV -- regular, S1S2  abd -- soft, ND, NT  Ext -- no edema, + peripheral pulses    Evaluation of Cognitive Function   Mood/affect:  happy  Orientation: Person, Place, Time and Situation  Appearance: age appropriate and casually dressed      Preventive Services     Discussed today Done Previously Not Needed       Pneumococcal vaccines    x  Flu vaccine      Hepatitis B vaccine (if at risk)      Shingles vaccine   x   TDAP vaccine   x   Mammogram   X 2017   Pap smear      Colorectal cancer screening      Low-dose CT for lung cancer screening      Bone mineral density test      Glaucoma screening      Cholesterol test                       Discussion of Advance Directive   Did not discuss with Karson Sow her ability to prepare and advance directive in the case that an injury or illness causes her to be unable to make health care decisions. Patient accompanied by her mother who is her decision maker. Assessment/Plan   Z00.00    ICD-10-CM ICD-9-CM    1. Medicare annual wellness visit, subsequent Z00.00 V70.0    2. Breast cancer screening Z12.39 V76.10 CLIFF 3D JIMI W MAMMO BI SCREENING INCL CAD      CBC W/O DIFF      METABOLIC PANEL, COMPREHENSIVE      LIPID PANEL      HEMOGLOBIN A1C WITH EAG      TSH 3RD GENERATION   3.  Bipolar 1 disorder (HCC) F31.9 296.7 CBC W/O DIFF      METABOLIC PANEL, COMPREHENSIVE      LIPID PANEL      HEMOGLOBIN A1C WITH EAG TSH 3RD GENERATION   4.  Encounter for long-term (current) use of other medications Z79.899 V58.69 CBC W/O DIFF      METABOLIC PANEL, COMPREHENSIVE      LIPID PANEL      HEMOGLOBIN A1C WITH EAG      TSH 3RD GENERATION       Orders Placed This Encounter    CLIFF 3D JIMI W MAMMO BI SCREENING INCL CAD    CBC W/O DIFF    METABOLIC PANEL, COMPREHENSIVE    LIPID PANEL    HEMOGLOBIN A1C WITH EAG    TSH 3RD GENERATION           Destinee Kendall MD

## 2020-01-07 ENCOUNTER — HOSPITAL ENCOUNTER (OUTPATIENT)
Dept: MAMMOGRAPHY | Age: 45
Discharge: HOME | End: 2020-01-07
Attending: FAMILY MEDICINE
Payer: MEDICARE

## 2020-01-07 DIAGNOSIS — Z12.39 BREAST CANCER SCREENING: ICD-10-CM

## 2020-01-07 PROCEDURE — 77063 BREAST TOMOSYNTHESIS BI: CPT

## 2020-02-20 DIAGNOSIS — Z01.419 WELL WOMAN EXAM: ICD-10-CM

## 2020-02-22 RX ORDER — DROSPIRENONE AND ETHINYL ESTRADIOL 0.02-3(28)
KIT ORAL
Qty: 84 TAB | Refills: 0 | Status: SHIPPED | OUTPATIENT
Start: 2020-02-22 | End: 2020-07-08

## 2020-06-30 DIAGNOSIS — Z01.419 WELL WOMAN EXAM: ICD-10-CM

## 2020-07-08 RX ORDER — DROSPIRENONE AND ETHINYL ESTRADIOL 0.02-3(28)
KIT ORAL
Qty: 28 TAB | Refills: 5 | Status: SHIPPED | OUTPATIENT
Start: 2020-07-08 | End: 2020-11-17

## 2020-07-08 NOTE — TELEPHONE ENCOUNTER
Refilled patient's OCP for 6 months. It looks like she was seen for her yearly physical in December of 2019 so she should call and set up a yearly physical for December 2020 to be seen and to have further refills.     Tanvir Crook, PGY2  [address]  Medicine Residency

## 2020-07-08 NOTE — TELEPHONE ENCOUNTER
Previous Dr. Donna Pearce patient:      ----- Message from Micheal Martinez sent at 7/7/2020  6:03 PM EDT -----  Regarding: Dr. Donna Pearce / refill  Contact: [phone]  Caller (if not patient): Kirstie   Relationship of caller (if not patient): Pharmacist   Best contact number(s): [phone]  Name of medication and dosage if known: \"birth control\"  Is patient out of this medication (yes/no): yes  Pharmacy name: [facility] listed in chart? (yes/no): yes  Pharmacy phone number: n/a  Date of last visit: December 20, 2019  Details to clarify the request: Stated they have attempted twice to fax request. The last request was faxed over 6/30/20.

## 2020-11-17 DIAGNOSIS — Z01.419 WELL WOMAN EXAM: ICD-10-CM

## 2020-11-17 RX ORDER — DROSPIRENONE AND ETHINYL ESTRADIOL 0.02-3(28)
KIT ORAL
Qty: 28 TAB | Refills: 0 | Status: SHIPPED | OUTPATIENT
Start: 2020-11-17 | End: 2020-11-17

## 2020-12-02 ENCOUNTER — TELEPHONE (OUTPATIENT)
Dept: FAMILY MEDICINE CLINIC | Age: 45
End: 2020-12-02

## 2020-12-02 NOTE — TELEPHONE ENCOUNTER
----- Message from Breana Lees sent at 12/1/2020 12:04 PM EST -----  Regarding: Dr. Eddie Fernando  General Message/Vendor Calls    Caller's first and last name: Cherrie Muñoz, Mother       Reason for call: Complete Physical      Callback required yes/no and why: Yes       Best contact number(s): [phone]      Details to clarify the request: Patient lives in a group home and requires a yearly physical to keep her residence, please contact the patients mother, Vanessa Patel at [phone] to schedule      Breana Lees

## 2020-12-14 NOTE — TELEPHONE ENCOUNTER
Left voice mail. DO Brice/Telephone   Received: 1 week ago   Message Contents   [place], [address] [facility] [place]               Patient return call     Caller's first and last name and relationship (if not the patient): Emelia Ty, mother. Best contact number(s): [phone]       Whose call is being returned: [facility]. Details to clarify the request: Pt's mother missed call from [facility] to set up CPE for pt.        Jose Gross

## 2020-12-14 NOTE — TELEPHONE ENCOUNTER
Pt mother will check with independent living facility to see if they will accept VV/cpe. She states she already have the form. Her daughter doesn't live in a Group Home as noted in previous message. She wanted to make that clear.

## 2020-12-22 NOTE — PROGRESS NOTES
[address]  [address] [facility] [address]  [phone]             Date of visit: 12/25/2020       This is an Subsequent Medicare Annual Wellness Visit (AWV), (Performed more than 12 months after effective date of Medicare Part B enrollment and 12 months after last preventive visit, Once in a lifetime)    I have reviewed the patient's medical history in detail and updated the computerized patient record. Ricardo Mejia is a 54 y.o. female   History obtained from: the patient. Concerns today   (Patient understands that medical problems addressed today may incur additional cost as this is a preventive visit)  -Lives in residential home due to bipolar disorder requiring special needs. Needs annual form completed. History     Patient Active Problem List   Diagnosis Code    Metrorrhagia N92.1    Depression F32.9    Intellectual disability F68    Bipolar 1 disorder ([facility] [address] F31.9     Past Medical History:   Diagnosis Date    Bipolar 1 disorder ([facility] [address]     Calculus of kidney     Cellulitis 8/1/2017    Depression     Intellectual disability       History reviewed. No pertinent surgical history. No Known Allergies  Current Outpatient Medications   Medication Sig Dispense Refill    drospirenone-ethinyl estradioL (FRANKO) 3-0.02 mg tab TAKE 1 TABLET BY MOUTH DAILY 28 Tab 11    naproxen sodium (ALEVE) 220 mg tablet Take 220 mg by mouth as needed.  DIPHENHYDRAMINE HCL (BENADRYL ALLERGY PO) Take  by mouth.  ARIPiprazole (ABILIFY) 15 mg tablet Take 15 mg by mouth daily.  topiramate (TOPAMAX) 100 mg tablet Take  by mouth daily.  buPROPion SR (WELLBUTRIN SR) 150 mg SR tablet Take 150 mg by mouth daily.        Family History   Problem Relation Age of Onset    Depression Mother     Cancer Father         colon     Social History     Tobacco Use    Smoking status: Never Smoker    Smokeless tobacco: Never Used   Substance Use Topics    Alcohol use: No       Specialists/Care Team   Huma Wilkinson has established care with the following healthcare providers:  Pscyhiatry: Dr. Sina Lacey   female  54 y.o. General Health Questions   -During the past 4 weeks:   -how would you rate your health in general? Good   -how often have you been bothered by feeling dizzy when standing up? never   -how much have you been bothered by bodily pain? none   -Have you noticed any hearing difficulties? no   -has your physical and emotional health limited your social activities with family or friends? no    Emotional Health Questions   -Do you have a history of depression, anxiety, or emotional problems? Has bipolar disorder being treated  -Over the past 2 weeks, have you felt down, depressed or hopeless? no  -Over the past 2 weeks, have you felt little interest or pleasure in doing things? no    Health Habits   Please describe your diet habits: Macel Greet  Do you get 5 servings of fruits or vegetables daily? yes  Do you exercise regularly? Yes, pilates at home    Activities of Daily Living and Functional Status   -Do you need help with eating, walking, dressing, bathing, toileting, the phone, transportation, shopping, preparing meals, housework, laundry, medications or managing money? no  -In the past four weeks, was someone available to help you if you needed and wanted help with anything? no  -Are you confident are you that you can control and manage most of your health problems? yes  -Have you been given information to help you keep track of your medications? yes  -How often do you have trouble taking your medications as prescribed? never    Fall Risk and Home Safety   Have you fallen 2 or more times in the past year? no  Does your home have rugs in the hallway, lack grab bars in the bathroom, lack handrails on the stairs or have poor lighting? no  Do you have smoke detectors and check them regularly?  yes  Do you have difficulties driving a car? Does not drive  Do you always fasten your seat belt when you are in a car? yes    Review of Systems (if indicated for problems addressed today)   Review of Systems   Constitutional: Negative for chills and fever. HENT: Negative for hearing loss. Eyes: Negative for blurred vision. Respiratory: Negative for shortness of breath. Cardiovascular: Negative for chest pain. Gastrointestinal: Negative for abdominal pain, constipation, diarrhea and vomiting. Genitourinary: Negative for dysuria. Neurological: Negative for dizziness and headaches. Psychiatric/Behavioral: Negative for depression. Physical Examination     Vitals:    12/23/20 1041   BP: 106/66   Pulse: 88   Resp: 16   Temp: 97.8 °F (36.6 °C)   TempSrc: Temporal   SpO2: 96%   Weight: 184 lb 2 oz (83.5 kg)   Height: 5' 4\" (1.626 m)     Body mass index is 31.6 kg/m². Physical Exam  Constitutional:       Appearance: Normal appearance. HENT:      Head: Normocephalic and atraumatic. Neck:      Musculoskeletal: Normal range of motion. Cardiovascular:      Rate and Rhythm: Normal rate and regular rhythm. Pulses: Normal pulses. Pulmonary:      Effort: Pulmonary effort is normal.      Breath sounds: Normal breath sounds. Abdominal:      General: Bowel sounds are normal.      Tenderness: There is no abdominal tenderness. Musculoskeletal: Normal range of motion. Skin:     General: Skin is warm and dry. Capillary Refill: Capillary refill takes less than 2 seconds. Neurological:      General: No focal deficit present. Mental Status: She is alert.    Psychiatric:         Mood and Affect: Mood normal.         Behavior: Behavior normal.         Evaluation of Cognitive Function   Mood/affect:  happy  Orientation: Person, Place, Time and Situation  Appearance: age appropriate  Family member/caregiver input: at [place]   (Preventive care checklist to be included in patient instructions)  Discussed today Done Previously Not Needed     X  Pneumococcal vaccines      Flu vaccine    X  TDAP vaccine   X   Mammogram    X 2017  Pap smear     Discussion of Advance Directive   Discussed with Huma Wilkinson her ability to prepare and advance directive in the case that an injury or illness causes her to be unable to make health care decisions.   - Mother is decision maker.   - full code    Assessment/Plan   Huma Wilkinson is a 54 y.o. who is here for their subsequent Medicare annual wellness visit    1. Medicare annual wellness visit, subsequent  - Follow up for [address] in 1 year  - Residential home form filled out    2. Encounter for screening mammogram for breast cancer  - CLIFF MAMMO BI SCREENING INCL CAD; Future    3. Class 1 obesity with body mass index (BMI) of 31.0 to 31.9 in adult, unspecified obesity type, unspecified whether serious comorbidity present  - Continue [place] diet  - LIPID PANEL; Future  - METABOLIC PANEL, COMPREHENSIVE; Future  - CBC W/O DIFF; Future    4. Screening-pulmonary TB: required for residential home  - QUANTIFERON-TB GOLD PLUS; Future    5. Advanced directives, counseling/discussion  - FULL CODE    6. Screening for depression  - DEPRESSION SCREEN ANNUAL    8. Encounter for screening mammogram for malignant neoplasm of breast  - CLIFF MAMMO BI SCREENING INCL CAD; Future      Follow-up and Dispositions    · Return in about 1 year (around 12/23/2021) for Annual wellness visit.          Patient seen and discussed with Dr. Efrain Sanders (Attending Physician)     Patricia Narayanan DO

## 2020-12-23 ENCOUNTER — OFFICE VISIT (OUTPATIENT)
Dept: FAMILY MEDICINE CLINIC | Age: 45
End: 2020-12-23
Payer: MEDICARE

## 2020-12-23 VITALS
HEART RATE: 88 BPM | RESPIRATION RATE: 16 BRPM | TEMPERATURE: 97.8 F | BODY MASS INDEX: 31.44 KG/M2 | OXYGEN SATURATION: 96 % | HEIGHT: 64 IN | SYSTOLIC BLOOD PRESSURE: 106 MMHG | DIASTOLIC BLOOD PRESSURE: 66 MMHG | WEIGHT: 184.13 LBS

## 2020-12-23 DIAGNOSIS — Z12.31 ENCOUNTER FOR SCREENING MAMMOGRAM FOR BREAST CANCER: ICD-10-CM

## 2020-12-23 DIAGNOSIS — Z71.89 ADVANCED DIRECTIVES, COUNSELING/DISCUSSION: ICD-10-CM

## 2020-12-23 DIAGNOSIS — Z00.00 MEDICARE ANNUAL WELLNESS VISIT, SUBSEQUENT: Primary | ICD-10-CM

## 2020-12-23 DIAGNOSIS — Z11.1 SCREENING-PULMONARY TB: ICD-10-CM

## 2020-12-23 DIAGNOSIS — E66.9 CLASS 1 OBESITY WITH BODY MASS INDEX (BMI) OF 31.0 TO 31.9 IN ADULT, UNSPECIFIED OBESITY TYPE, UNSPECIFIED WHETHER SERIOUS COMORBIDITY PRESENT: ICD-10-CM

## 2020-12-23 DIAGNOSIS — Z13.31 SCREENING FOR DEPRESSION: ICD-10-CM

## 2020-12-23 LAB
ALBUMIN SERPL-MCNC: 4.1 G/DL (ref 3.5–5)
ALBUMIN/GLOB SERPL: 1.5 {RATIO} (ref 1.1–2.2)
ALP SERPL-CCNC: 79 U/L (ref 45–117)
ALT SERPL-CCNC: 23 U/L (ref 12–78)
ANION GAP SERPL CALC-SCNC: 6 MMOL/L (ref 5–15)
AST SERPL-CCNC: 13 U/L (ref 15–37)
BILIRUB SERPL-MCNC: 0.3 MG/DL (ref 0.2–1)
BUN SERPL-MCNC: 13 MG/DL (ref 6–20)
BUN/CREAT SERPL: 24 (ref 12–20)
CALCIUM SERPL-MCNC: 8.7 MG/DL (ref 8.5–10.1)
CHLORIDE SERPL-SCNC: 104 MMOL/L (ref 97–108)
CHOLEST SERPL-MCNC: 188 MG/DL
CO2 SERPL-SCNC: 23 MMOL/L (ref 21–32)
CREAT SERPL-MCNC: 0.54 MG/DL (ref 0.55–1.02)
ERYTHROCYTE [DISTWIDTH] IN BLOOD BY AUTOMATED COUNT: 12 % (ref 11.5–14.5)
GLOBULIN SER CALC-MCNC: 2.7 G/DL (ref 2–4)
GLUCOSE SERPL-MCNC: 90 MG/DL (ref 65–100)
HCT VFR BLD AUTO: 41.4 % (ref 35–47)
HDLC SERPL-MCNC: 79 MG/DL
HDLC SERPL: 2.4 {RATIO} (ref 0–5)
HGB BLD-MCNC: 14.5 G/DL (ref 11.5–16)
LDLC SERPL CALC-MCNC: 91.4 MG/DL (ref 0–100)
LIPID PROFILE,FLP: NORMAL
MCH RBC QN AUTO: 32.4 PG (ref 26–34)
MCHC RBC AUTO-ENTMCNC: 35 G/DL (ref 30–36.5)
MCV RBC AUTO: 92.4 FL (ref 80–99)
NRBC # BLD: 0 K/UL (ref 0–0.01)
NRBC BLD-RTO: 0 PER 100 WBC
PLATELET # BLD AUTO: 165 K/UL (ref 150–400)
PMV BLD AUTO: 10.5 FL (ref 8.9–12.9)
POTASSIUM SERPL-SCNC: 3.8 MMOL/L (ref 3.5–5.1)
PROT SERPL-MCNC: 6.8 G/DL (ref 6.4–8.2)
RBC # BLD AUTO: 4.48 M/UL (ref 3.8–5.2)
SODIUM SERPL-SCNC: 133 MMOL/L (ref 136–145)
TRIGL SERPL-MCNC: 88 MG/DL (ref ?–150)
VLDLC SERPL CALC-MCNC: 17.6 MG/DL
WBC # BLD AUTO: 7.4 K/UL (ref 3.6–11)

## 2020-12-23 PROCEDURE — G8427 DOCREV CUR MEDS BY ELIG CLIN: HCPCS | Performed by: STUDENT IN AN ORGANIZED HEALTH CARE EDUCATION/TRAINING PROGRAM

## 2020-12-23 PROCEDURE — G0439 PPPS, SUBSEQ VISIT: HCPCS | Performed by: STUDENT IN AN ORGANIZED HEALTH CARE EDUCATION/TRAINING PROGRAM

## 2020-12-23 PROCEDURE — G8417 CALC BMI ABV UP PARAM F/U: HCPCS | Performed by: STUDENT IN AN ORGANIZED HEALTH CARE EDUCATION/TRAINING PROGRAM

## 2020-12-23 PROCEDURE — G9717 DOC PT DX DEP/BP F/U NT REQ: HCPCS | Performed by: STUDENT IN AN ORGANIZED HEALTH CARE EDUCATION/TRAINING PROGRAM

## 2020-12-23 NOTE — PATIENT INSTRUCTIONS
Well Visit, Ages 25 to 48: Care Instructions  Your Care Instructions     Physical exams can help you stay healthy. Your doctor has checked your overall health and may have suggested ways to take good care of yourself. He or she also may have recommended tests. At home, you can help prevent illness with healthy eating, regular exercise, and other steps. Follow-up care is a key part of your treatment and safety. Be sure to make and go to all appointments, and call your doctor if you are having problems. It's also a good idea to know your test results and keep a list of the medicines you take. How can you care for yourself at home? · Reach and stay at a healthy weight. This will lower your risk for many problems, such as obesity, diabetes, heart disease, and high blood pressure. · Get at least 30 minutes of physical activity on most days of the week. Walking is a good choice. You also may want to do other activities, such as running, swimming, cycling, or playing tennis or team sports. Discuss any changes in your exercise program with your doctor. · Do not smoke or allow others to smoke around you. If you need help quitting, talk to your doctor about stop-smoking programs and medicines. These can increase your chances of quitting for good. · Talk to your doctor about whether you have any risk factors for sexually transmitted infections (STIs). Having one sex partner (who does not have STIs and does not have sex with anyone else) is a good way to avoid these infections. · Use birth control if you do not want to have children at this time. Talk with your doctor about the choices available and what might be best for you. · Protect your skin from too much sun. When you're outdoors from 10 a.m. to 4 p.m., stay in the shade or cover up with clothing and a hat with a wide brim. Wear sunglasses that block UV rays. Even when it's cloudy, put broad-spectrum sunscreen (SPF 30 or higher) on any exposed skin.   · See a dentist one or two times a year for checkups and to have your teeth cleaned. · Wear a seat belt in the car. Follow your doctor's advice about when to have certain tests. These tests can spot problems early. For everyone  · Cholesterol. Have the fat (cholesterol) in your blood tested after age 21. Your doctor will tell you how often to have this done based on your age, family history, or other things that can increase your risk for heart disease. · Blood pressure. Have your blood pressure checked during a routine doctor visit. Your doctor will tell you how often to check your blood pressure based on your age, your blood pressure results, and other factors. · Vision. Talk with your doctor about how often to have a glaucoma test.  · Diabetes. Ask your doctor whether you should have tests for diabetes. · Colon cancer. Your risk for colorectal cancer gets higher as you get older. Some experts say that adults should start regular screening at age 48 and stop at age 76. Others say to start before age 48 or continue after age 76. Talk with your doctor about your risk and when to start and stop screening. For women  · Breast exam and mammogram. Talk to your doctor about when you should have a clinical breast exam and a mammogram. Medical experts differ on whether and how often women under 50 should have these tests. Your doctor can help you decide what is right for you. · Cervical cancer screening test and pelvic exam. Begin with a Pap test at age 24. The test often is part of a pelvic exam. Starting at age 27, you may choose to have a Pap test, an HPV test, or both tests at the same time (called co-testing). Talk with your doctor about how often to have testing. · Tests for sexually transmitted infections (STIs). Ask whether you should have tests for STIs. You may be at risk if you have sex with more than one person, especially if your partners do not wear condoms.   For men  · Tests for sexually transmitted infections (STIs). Ask whether you should have tests for STIs. You may be at risk if you have sex with more than one person, especially if you do not wear a condom. · Testicular cancer exam. Ask your doctor whether you should check your testicles regularly. · Prostate exam. Talk to your doctor about whether you should have a blood test (called a PSA test) for prostate cancer. Experts differ on whether and when men should have this test. Some experts suggest it if you are older than 39 and are -American or have a father or brother who got prostate cancer when he was younger than 72. When should you call for help? Watch closely for changes in your health, and be sure to contact your doctor if you have any problems or symptoms that concern you. Where can you learn more? Go to http://www.wright.com/  Enter P072 in the search box to learn more about \"Well Visit, Ages 25 to 48: Care Instructions. \"  Current as of: May 27, 2020               Content Version: 12.6  © 5395-6635 Healthwise, Incorporated. Care instructions adapted under license by [employer] (which disclaims liability or warranty for this information). If you have questions about a medical condition or this instruction, always ask your healthcare professional. [place] [address] any warranty or liability for your use of this information. Medicare Wellness Visit, Female     The best way to live healthy is to have a lifestyle where you eat a well-balanced diet, exercise regularly, limit alcohol use, and quit all forms of tobacco/nicotine, if applicable. Regular preventive services are another way to keep healthy. Preventive services (vaccines, screening tests, monitoring & exams) can help personalize your care plan, which helps you manage your own care. Screening tests can find health problems at the earliest stages, when they are easiest to treat.    [facility] follows the current, evidence-based guidelines published by the [place] (USPSTF) when recommending preventive services for our patients. Because we follow these guidelines, sometimes recommendations change over time as research supports it. (For example, mammograms used to be recommended annually. Even though Medicare will still pay for an annual mammogram, the newer guidelines recommend a mammogram every two years for women of average risk). Of course, you and your doctor may decide to screen more often for some diseases, based on your risk and your co-morbidities (chronic disease you are already diagnosed with). Preventive services for you include:  - Medicare offers their members a free annual wellness visit, which is time for you and your primary care provider to discuss and plan for your preventive service needs. Take advantage of this benefit every year!  -All adults over the age of 72 should receive the recommended pneumonia vaccines. Current USPSTF guidelines recommend a series of two vaccines for the best pneumonia protection.   -All adults should have a flu vaccine yearly and a tetanus vaccine every 10 years.   -All adults age 48 and older should receive the shingles vaccines (series of two vaccines). -All adults age 38-68 who are overweight should have a diabetes screening test once every three years.   -All adults born between 80 and 1965 should be screened once for Hepatitis C.  -Other screening tests and preventive services for persons with diabetes include: an eye exam to screen for diabetic retinopathy, a kidney function test, a foot exam, and stricter control over your cholesterol.   -Cardiovascular screening for adults with routine risk involves an electrocardiogram (ECG) at intervals determined by your doctor.   -Colorectal cancer screenings should be done for adults age 54-65 with no increased risk factors for colorectal cancer.   There are a number of acceptable methods of screening for this type of cancer. Each test has its own benefits and drawbacks. Discuss with your doctor what is most appropriate for you during your annual wellness visit. The different tests include: colonoscopy (considered the best screening method), a fecal occult blood test, a fecal DNA test, and sigmoidoscopy.    -A bone mass density test is recommended when a woman turns 65 to screen for osteoporosis. This test is only recommended one time, as a screening. Some providers will use this same test as a disease monitoring tool if you already have osteoporosis. -Breast cancer screenings are recommended every other year for women of normal risk, age 54-69.  -Cervical cancer screenings for women over age 72 are only recommended with certain risk factors. Here is a list of your current Health Maintenance items (your personalized list of preventive services) with a due date:  Health Maintenance Due   Topic Date Due    Pap Test  02/15/2020         Medicare Wellness Visit, Female     The best way to live healthy is to have a lifestyle where you eat a well-balanced diet, exercise regularly, limit alcohol use, and quit all forms of tobacco/nicotine, if applicable. Regular preventive services are another way to keep healthy. Preventive services (vaccines, screening tests, monitoring & exams) can help personalize your care plan, which helps you manage your own care. Screening tests can find health problems at the earliest stages, when they are easiest to treat. [facility] follows the current, evidence-based guidelines published by the [facility]  [place] (USPSTF) when recommending preventive services for our patients. Because we follow these guidelines, sometimes recommendations change over time as research supports it. (For example, mammograms used to be recommended annually.  Even though Medicare will still pay for an annual mammogram, the newer guidelines recommend a mammogram every two years for women of average risk). Of course, you and your doctor may decide to screen more often for some diseases, based on your risk and your co-morbidities (chronic disease you are already diagnosed with). Preventive services for you include:  - Medicare offers their members a free annual wellness visit, which is time for you and your primary care provider to discuss and plan for your preventive service needs. Take advantage of this benefit every year!  -All adults over the age of 72 should receive the recommended pneumonia vaccines. Current USPSTF guidelines recommend a series of two vaccines for the best pneumonia protection.   -All adults should have a flu vaccine yearly and a tetanus vaccine every 10 years.   -All adults age 48 and older should receive the shingles vaccines (series of two vaccines). -All adults age 38-68 who are overweight should have a diabetes screening test once every three years.   -All adults born between 80 and 1965 should be screened once for Hepatitis C.  -Other screening tests and preventive services for persons with diabetes include: an eye exam to screen for diabetic retinopathy, a kidney function test, a foot exam, and stricter control over your cholesterol.   -Cardiovascular screening for adults with routine risk involves an electrocardiogram (ECG) at intervals determined by your doctor.   -Colorectal cancer screenings should be done for adults age 54-65 with no increased risk factors for colorectal cancer. There are a number of acceptable methods of screening for this type of cancer. Each test has its own benefits and drawbacks. Discuss with your doctor what is most appropriate for you during your annual wellness visit.  The different tests include: colonoscopy (considered the best screening method), a fecal occult blood test, a fecal DNA test, and sigmoidoscopy.    -A bone mass density test is recommended when a woman turns 65 to screen for osteoporosis. This test is only recommended one time, as a screening. Some providers will use this same test as a disease monitoring tool if you already have osteoporosis. -Breast cancer screenings are recommended every other year for women of normal risk, age 54-69.  -Cervical cancer screenings for women over age 72 are only recommended with certain risk factors. Here is a list of your current Health Maintenance items (your personalized list of preventive services) with a due date:  Health Maintenance Due   Topic Date Due    Pap Test  02/15/2020         Medicare Wellness Visit, Female     The best way to live healthy is to have a lifestyle where you eat a well-balanced diet, exercise regularly, limit alcohol use, and quit all forms of tobacco/nicotine, if applicable. Regular preventive services are another way to keep healthy. Preventive services (vaccines, screening tests, monitoring & exams) can help personalize your care plan, which helps you manage your own care. Screening tests can find health problems at the earliest stages, when they are easiest to treat. [facility] follows the current, evidence-based guidelines published by the [facility] [place] (USPSTF) when recommending preventive services for our patients. Because we follow these guidelines, sometimes recommendations change over time as research supports it. (For example, mammograms used to be recommended annually. Even though Medicare will still pay for an annual mammogram, the newer guidelines recommend a mammogram every two years for women of average risk). Of course, you and your doctor may decide to screen more often for some diseases, based on your risk and your co-morbidities (chronic disease you are already diagnosed with).      Preventive services for you include:  - Medicare offers their members a free annual wellness visit, which is time for you and your primary care provider to discuss and plan for your preventive service needs. Take advantage of this benefit every year!  -All adults over the age of 72 should receive the recommended pneumonia vaccines. Current USPSTF guidelines recommend a series of two vaccines for the best pneumonia protection.   -All adults should have a flu vaccine yearly and a tetanus vaccine every 10 years.   -All adults age 48 and older should receive the shingles vaccines (series of two vaccines). -All adults age 38-68 who are overweight should have a diabetes screening test once every three years.   -All adults born between 80 and 1965 should be screened once for Hepatitis C.  -Other screening tests and preventive services for persons with diabetes include: an eye exam to screen for diabetic retinopathy, a kidney function test, a foot exam, and stricter control over your cholesterol.   -Cardiovascular screening for adults with routine risk involves an electrocardiogram (ECG) at intervals determined by your doctor.   -Colorectal cancer screenings should be done for adults age 54-65 with no increased risk factors for colorectal cancer. There are a number of acceptable methods of screening for this type of cancer. Each test has its own benefits and drawbacks. Discuss with your doctor what is most appropriate for you during your annual wellness visit. The different tests include: colonoscopy (considered the best screening method), a fecal occult blood test, a fecal DNA test, and sigmoidoscopy.    -A bone mass density test is recommended when a woman turns 65 to screen for osteoporosis. This test is only recommended one time, as a screening. Some providers will use this same test as a disease monitoring tool if you already have osteoporosis. -Breast cancer screenings are recommended every other year for women of normal risk, age 54-69.  -Cervical cancer screenings for women over age 72 are only recommended with certain risk factors.      Here is a list of your current Health Maintenance items (your personalized list of preventive services) with a due date:  Health Maintenance Due   Topic Date Due    Pap Test  02/15/2020

## 2020-12-28 LAB
GAMMA INTERFERON BACKGROUND BLD IA-ACNC: 0.02 IU/ML
M TB IFN-G BLD-IMP: NEGATIVE
M TB IFN-G CD4+ BCKGRND COR BLD-ACNC: 0.02 IU/ML
MITOGEN IGNF BLD-ACNC: >10 IU/ML
QUANTIFERON INCUBATION, QF1T: NORMAL
QUANTIFERON TB2 AG: 0.02 IU/ML
SERVICE CMNT-IMP: NORMAL

## 2020-12-30 NOTE — PROGRESS NOTES
CBC, lipid, LFT's wnl  Quant TB gold neg  Sodium mildly low at 133. Per mother, patient has been drinking more fluid than usual due to her diet plan. Also on medications that can cause hyponatremia. Discussed results with mother via phone call.  Will decrease fluid intake and also review results with psychiatrist.

## 2021-01-07 ENCOUNTER — TELEPHONE (OUTPATIENT)
Dept: FAMILY MEDICINE CLINIC | Age: 46
End: 2021-01-07

## 2021-01-20 ENCOUNTER — HOSPITAL ENCOUNTER (OUTPATIENT)
Dept: MAMMOGRAPHY | Age: 46
Discharge: HOME | End: 2021-01-20
Attending: STUDENT IN AN ORGANIZED HEALTH CARE EDUCATION/TRAINING PROGRAM
Payer: MEDICAID

## 2021-01-20 DIAGNOSIS — Z12.31 ENCOUNTER FOR SCREENING MAMMOGRAM FOR BREAST CANCER: ICD-10-CM

## 2021-01-20 PROCEDURE — 77067 SCR MAMMO BI INCL CAD: CPT

## 2021-10-25 DIAGNOSIS — Z01.419 WELL WOMAN EXAM: ICD-10-CM

## 2021-10-29 RX ORDER — DROSPIRENONE AND ETHINYL ESTRADIOL 0.02-3(28)
1 KIT ORAL DAILY
Qty: 28 TABLET | Refills: 11 | Status: SHIPPED | OUTPATIENT
Start: 2021-10-29 | End: 2021-11-12

## 2022-02-04 ENCOUNTER — OFFICE VISIT (OUTPATIENT)
Dept: FAMILY MEDICINE CLINIC | Age: 47
End: 2022-02-04
Payer: MEDICARE

## 2022-02-04 VITALS
HEIGHT: 64 IN | RESPIRATION RATE: 18 BRPM | TEMPERATURE: 98.1 F | DIASTOLIC BLOOD PRESSURE: 69 MMHG | HEART RATE: 89 BPM | OXYGEN SATURATION: 98 % | BODY MASS INDEX: 29.71 KG/M2 | SYSTOLIC BLOOD PRESSURE: 106 MMHG | WEIGHT: 174 LBS

## 2022-02-04 DIAGNOSIS — Z12.39 SCREENING BREAST EXAMINATION: ICD-10-CM

## 2022-02-04 DIAGNOSIS — Z23 ENCOUNTER FOR IMMUNIZATION: ICD-10-CM

## 2022-02-04 DIAGNOSIS — Z01.419 ENCOUNTER FOR WELL WOMAN EXAM: Primary | ICD-10-CM

## 2022-02-04 DIAGNOSIS — Z12.4 PAP SMEAR FOR CERVICAL CANCER SCREENING: ICD-10-CM

## 2022-02-04 DIAGNOSIS — Z12.11 SCREENING FOR COLON CANCER: ICD-10-CM

## 2022-02-04 PROCEDURE — 90715 TDAP VACCINE 7 YRS/> IM: CPT | Performed by: STUDENT IN AN ORGANIZED HEALTH CARE EDUCATION/TRAINING PROGRAM

## 2022-02-04 PROCEDURE — 99214 OFFICE O/P EST MOD 30 MIN: CPT | Performed by: STUDENT IN AN ORGANIZED HEALTH CARE EDUCATION/TRAINING PROGRAM

## 2022-02-04 NOTE — PROGRESS NOTES
Subjective:      Patient ID: Vinny Garcia is an 51 y.o.  female who presents for annual physical.    Chief Complaint: annual physical    Medications managed by Dr. Karla Edge. Mother is decision maker. Diet: Healthy Choice meals  Exercise: pilates 1x/week  Sexual activity: not sexually active    Family history of colon cancer in father diagnosed at 53-62 years old. Health Maintenance:   -Pap smear 2017 NILM  -Mammogram 2021 normal  -Hep C no screening on file  -Tdap vaccine 2011  -Influenza vaccine UTD 9/2021  -COVID vaccines x 2    Immunization History   Administered Date(s) Administered    COVID-19, Pfizer Purple top, DILUTE for use, 12+ yrs, 30mcg/0.3mL dose 02/01/2021, 03/02/2021    Influenza Vaccine 01/03/2013, 10/08/2013, 09/30/2021    Influenza Vaccine (>6 mo Afluria QUAD Vial 96753 (0.25 mL) / 59847 (0.5 mL)) 09/24/2016    Influenza Vaccine (Quad) PF (>6 Mo Flulaval, Fluarix, and >3 Yrs Afluria, Fluzone [id number]) 09/18/2017, 09/21/2018, 10/07/2019, 09/15/2020    Influenza Vaccine Split 11/29/2011    PPD 11/02/2010, 11/29/2011, 01/03/2013    TB Skin Test (PPD) Intradermal 05/30/2014, 11/11/2015, 04/05/2019    TDAP Vaccine 11/29/2011         Past Medical History:   Diagnosis Date    Bipolar 1 disorder ([facility] [address]     Calculus of kidney     Cellulitis 8/1/2017    Depression     Intellectual disability      Family History   Problem Relation Age of Onset    Depression Mother     Cancer Father         colon     Current Outpatient Medications   Medication Sig Dispense Refill    drospirenone-ethinyl estradioL (FRANKO) 3-0.02 mg tab TAKE 1 TABLET BY MOUTH EVERY DAY 28 Tablet 11    naproxen sodium (ALEVE) 220 mg tablet Take 220 mg by mouth as needed.  ARIPiprazole (ABILIFY) 15 mg tablet Take 15 mg by mouth daily.  topiramate (TOPAMAX) 100 mg tablet Take  by mouth daily.  buPROPion SR (WELLBUTRIN SR) 150 mg SR tablet Take 150 mg by mouth daily.        No Known Allergies  Social History Socioeconomic History    Marital status:      Spouse name: Not on file    Number of children: Not on file    Years of education: Not on file    Highest education level: Not on file   Occupational History    Not on file   Tobacco Use    Smoking status: Never Smoker    Smokeless tobacco: Never Used   Substance and Sexual Activity    Alcohol use: No    Drug use: No    Sexual activity: Yes     Partners: Male     Birth control/protection: Pill   Other Topics Concern    Not on file   Social History Narrative    Not on file     Social Determinants of Health     Financial Resource Strain:     Difficulty of Paying Living Expenses: Not on file   Food Insecurity:     Worried About Running Out of Food in the Last Year: Not on file    [place] of Food in the Last Year: Not on file   Transportation Needs:     Lack of Transportation (Medical): Not on file    Lack of Transportation (Non-Medical): Not on file   Physical Activity:     Days of Exercise per Week: Not on file    Minutes of Exercise per Session: Not on file   Stress:     Feeling of Stress : Not on file   Social Connections:     Frequency of Communication with Friends and Family: Not on file    Frequency of Social Gatherings with Friends and Family: Not on file    Attends Alevism Services: Not on file    Active Member of [address] [place] [address] [employer] or Organizations: Not on file    Attends Club or Organization Meetings: Not on file    Marital Status: Not on file   Intimate Partner Violence:     Fear of Current or Ex-Partner: Not on file    Emotionally Abused: Not on file    Physically Abused: Not on file    Sexually Abused: Not on file   Housing Stability:     Unable to Pay for Housing in the Last Year: Not on file    Number of [place] in the Last Year: Not on file    Unstable Housing in the Last Year: Not on file       Review of Systems   Constitutional: Negative for chills and fever. HENT: Negative. Eyes: Negative for blurred vision. Respiratory: Negative for cough. Cardiovascular: Negative for chest pain. Gastrointestinal: Negative for abdominal pain, nausea and vomiting. Genitourinary: Negative for dysuria. Skin: Negative for rash. Neurological: Negative for dizziness and headaches. Psychiatric/Behavioral: Negative for depression. Objective:   Physical Exam  Exam conducted with a chaperone present. Constitutional:       General: She is not in acute distress. HENT:      Head: Normocephalic. Eyes:      Extraocular Movements: Extraocular movements intact. Cardiovascular:      Rate and Rhythm: Normal rate and regular rhythm. Pulses: Normal pulses. Heart sounds: Normal heart sounds. Pulmonary:      Effort: Pulmonary effort is normal. No respiratory distress. Breath sounds: Normal breath sounds. Abdominal:      General: Bowel sounds are normal. There is no distension. Palpations: Abdomen is soft. Tenderness: There is no abdominal tenderness. Genitourinary:     Exam position: Lithotomy position. Pubic Area: No rash. Labia:         Right: No lesion. Left: No lesion. Urethra: No prolapse. Vagina: No vaginal discharge, erythema, tenderness or bleeding. Cervix: No erythema. Musculoskeletal:      Cervical back: Normal range of motion and neck supple. Skin:     General: Skin is warm and dry. Neurological:      General: No focal deficit present. Mental Status: She is alert and oriented to person, place, and time. Psychiatric:         Mood and Affect: Mood and affect normal.         Behavior: Behavior is cooperative. Comments: Childlike demeanor and responses to questions         Assessment:       ICD-10-CM ICD-9-CM    1. Encounter for well woman exam  Z01.419 V72.31    2. Bipolar 1 disorder (HCC)  F31.9 296.7    3. Intellectual disability  F79 319    4. Pap smear for cervical cancer screening  Z12.4 V76.2    5.  Screening breast examination  Z12.39 V76.10        Plan:     Well woman exam  -Pap smear, mammogram ordered today  -Refer for colonoscopy with family h/o colon cancer  -Tdap today  -Hep C, diabetes, lipid screening today  -Counseling on healthy diet and exercise    Mental health: BPD1, depression, intellectual disability  -Continue follow up with Dr. Marsh      *ATTENTION:  This note has been created by a medical student for educational purposes only. Please do not refer to the content of this note for clinical decision-making, billing, or other purposes. Please see attending physicians note to obtain clinical information on this patient. *

## 2022-02-04 NOTE — PROGRESS NOTES
HPI:  Gila Salazar is a 51 y.o. female presenting for well woman exam.         Sexually active?: not at all  Method of family planning: on OCPs    Diet: Eats healthy choice meals    Exercise:  Does pilates    FMHx: father colon cancer at age of 61    Immunizations - reviewed:   Immunization History   Administered Date(s) Administered    COVID-19, Pfizer Purple top, DILUTE for use, 12+ yrs, 30mcg/0.3mL dose 2021, 2021    Influenza Vaccine 2013, 10/08/2013, 2021    Influenza Vaccine (>6 mo Afluria QUAD Vial 40018 (0.25 mL) / 98968 (0.5 mL)) 2016    Influenza Vaccine (Quad) PF (>6 Mo Flulaval, Fluarix, and >3 Yrs Afluria, Fluzone [id number]) 2017, 2018, 10/07/2019, 09/15/2020    Influenza Vaccine Split 2011    PPD 2010, 2011, 2013    TB Skin Test (PPD) Intradermal 2014, 2015, 2019    TDAP Vaccine 2011    Tdap 2022         Health Maintenance reviewed -  Pap smear 2017  Mammogram NILM 2020  Colonoscopy never    Allergies- reviewed:   No Known Allergies      Medications- reviewed:   Current Outpatient Medications   Medication Sig    drospirenone-ethinyl estradioL (FRANKO) 3-0.02 mg tab TAKE 1 TABLET BY MOUTH EVERY DAY    naproxen sodium (ALEVE) 220 mg tablet Take 220 mg by mouth as needed.  ARIPiprazole (ABILIFY) 15 mg tablet Take 15 mg by mouth daily.  topiramate (TOPAMAX) 100 mg tablet Take  by mouth daily.  buPROPion SR (WELLBUTRIN SR) 150 mg SR tablet Take 150 mg by mouth daily. No current facility-administered medications for this visit. Past Medical History- reviewed:  Past Medical History:   Diagnosis Date    Bipolar 1 disorder ([facility] [address]     Calculus of kidney     Cellulitis 2017    Depression     Intellectual disability          Past Surgical History- reviewed:   No past surgical history on file.       Family History - reviewed:  Family History   Problem Relation Age of Onset    Depression Mother     Cancer Father         colon         Social History - reviewed:  Social History     Socioeconomic History    Marital status:      Spouse name: Not on file    Number of children: Not on file    Years of education: Not on file    Highest education level: Not on file   Occupational History    Not on file   Tobacco Use    Smoking status: Never Smoker    Smokeless tobacco: Never Used   Substance and Sexual Activity    Alcohol use: No    Drug use: No    Sexual activity: Yes     Partners: Male     Birth control/protection: Pill   Other Topics Concern    Not on file   Social History Narrative    Not on file     Social Determinants of Health     Financial Resource Strain:     Difficulty of Paying Living Expenses: Not on file   Food Insecurity:     Worried About Running Out of Food in the Last Year: Not on file    [place] of Food in the Last Year: Not on file   Transportation Needs:     Lack of Transportation (Medical): Not on file    Lack of Transportation (Non-Medical):  Not on file   Physical Activity:     Days of Exercise per Week: Not on file    Minutes of Exercise per Session: Not on file   Stress:     Feeling of Stress : Not on file   Social Connections:     Frequency of Communication with Friends and Family: Not on file    Frequency of Social Gatherings with Friends and Family: Not on file    Attends Yarsani Services: Not on file    Active Member of [address] [place] [address] [employer] or Organizations: Not on file    Attends Club or Organization Meetings: Not on file    Marital Status: Not on file   Intimate Partner Violence:     Fear of Current or Ex-Partner: Not on file    Emotionally Abused: Not on file    Physically Abused: Not on file    Sexually Abused: Not on file   Housing Stability:     Unable to Pay for Housing in the Last Year: Not on file    Number of [place] in the Last Year: Not on file    Unstable Housing in the Last Year: Not on file         Review of Systems Review of Systems   Constitutional: Negative for chills and fever. Respiratory: Negative for cough and shortness of breath. Cardiovascular: Negative for chest pain and palpitations. Gastrointestinal: Negative for abdominal pain, nausea and vomiting. Genitourinary: Negative for dysuria, frequency and urgency. Musculoskeletal: Negative for myalgias and neck pain. Neurological: Negative for dizziness and headaches. Psychiatric/Behavioral: Negative for depression and suicidal ideas. Physical Exam  Visit Vitals  /69 (BP 1 Location: Right arm, BP Patient Position: Sitting, BP Cuff Size: Adult)   Pulse 89   Temp 98.1 °F (36.7 °C) (Oral)   Resp 18   Ht 5' 4\" (1.626 m)   Wt 174 lb (78.9 kg)   LMP  (Exact Date)   SpO2 98%   BMI 29.87 kg/m²       Physical Exam  Exam conducted with a chaperone present. Constitutional:       General: She is not in acute distress. HENT:      Head: Normocephalic and atraumatic. Right Ear: Tympanic membrane normal.      Left Ear: Tympanic membrane normal.      Nose: No congestion. Mouth/Throat:      Mouth: Mucous membranes are moist.      Pharynx: No oropharyngeal exudate. Eyes:      Conjunctiva/sclera: Conjunctivae normal.      Pupils: Pupils are equal, round, and reactive to light. Cardiovascular:      Rate and Rhythm: Normal rate and regular rhythm. Heart sounds: No murmur heard. Pulmonary:      Effort: Pulmonary effort is normal.   Abdominal:      Palpations: Abdomen is soft. Tenderness: There is no abdominal tenderness. There is no guarding or rebound. Genitourinary:     General: Normal vulva. Vagina: No vaginal discharge, tenderness or bleeding. Cervix: No friability, erythema or cervical bleeding. Musculoskeletal:      Right lower leg: No edema. Left lower leg: No edema. Neurological:      General: No focal deficit present. Mental Status: She is oriented to person, place, and time.    Psychiatric: Mood and Affect: Mood normal.         Behavior: Behavior normal.           Assessment/Plan:    ICD-10-CM ICD-9-CM    1. Encounter for well woman exam  Z01.419 V72.31 HEPATITIS C AB      LIPID PANEL      METABOLIC PANEL, COMPREHENSIVE      HEMOGLOBIN A1C WITH EAG      HEMOGLOBIN A1C WITH EAG      METABOLIC PANEL, COMPREHENSIVE      LIPID PANEL      HEPATITIS C AB   2. Pap smear for cervical cancer screening  Z12.4 V76.2 PAP IG, APTIMA HPV AND RFX 16/18,45 ([id number])   3. Screening breast examination  Z12.39 V76.10 CLIFF MAMMO BI SCREENING INCL CAD   4. Encounter for immunization  Z23 V03.89 TETANUS, DIPHTHERIA TOXOIDS AND ACELLULAR PERTUSSIS VACCINE (TDAP), IN INDIVIDS. >=7, IM      [place] IMMUNIZ ADMIN,1 SINGLE/COMB VAC/TOXOID   5. Screening for colon cancer  Z12.11 V76.51 REFERRAL TO GASTROENTEROLOGY         · Counseled re: diet, exercise, healthy lifestyle    · Appropriate labs, vaccines, imaging studies, and referrals ordered as listed above    · Will follow up pending lab work          I have discussed the diagnosis with the patient and the intended plan as seen in the above orders. Patient verbalized understanding of the plan and agrees with the plan. The patient has received an after-visit summary and questions were answered concerning future plans. I have discussed medication side effects and warnings with the patient as well. Informed patient to return to the office if new symptoms arise.         Kelly Beard DO  Family Medicine Resident

## 2022-02-04 NOTE — PROGRESS NOTES
Identified Patient with two Patient identifiers (Name and ). Two Patient Identifiers confirmed. Reviewed record in preparation for visit and have obtained necessary documentation. Chief Complaint   Patient presents with    Complete Physical     with Pap. Visit Vitals  /69 (BP 1 Location: Right arm, BP Patient Position: Sitting, BP Cuff Size: Adult)   Pulse 89   Temp 98.1 °F (36.7 °C) (Oral)   Resp 18   Ht 5' 4\" (1.626 m)   Wt 174 lb (78.9 kg)   SpO2 98%   BMI 29.87 kg/m²       1. Have you been to the ER, urgent care clinic since your last visit? Hospitalized since your last visit? No    2. Have you seen or consulted any other health care providers outside of the [address] [place] [address] since your last visit? Include any pap smears or colon screening.  No

## 2022-02-05 LAB
ALBUMIN SERPL-MCNC: 4.2 G/DL (ref 3.8–4.8)
ALBUMIN/GLOB SERPL: 1.8 {RATIO} (ref 1.2–2.2)
ALP SERPL-CCNC: 70 IU/L (ref 44–121)
ALT SERPL-CCNC: 14 IU/L (ref 0–32)
AST SERPL-CCNC: 21 IU/L (ref 0–40)
BILIRUB SERPL-MCNC: <0.2 MG/DL (ref 0–1.2)
BUN SERPL-MCNC: 19 MG/DL (ref 6–24)
BUN/CREAT SERPL: 28 (ref 9–23)
CALCIUM SERPL-MCNC: 8.8 MG/DL (ref 8.7–10.2)
CHLORIDE SERPL-SCNC: 103 MMOL/L (ref 96–106)
CHOLEST SERPL-MCNC: 224 MG/DL (ref 100–199)
CO2 SERPL-SCNC: 22 MMOL/L (ref 20–29)
CREAT SERPL-MCNC: 0.67 MG/DL (ref 0.57–1)
EST. AVERAGE GLUCOSE BLD GHB EST-MCNC: 97 MG/DL
GLOBULIN SER CALC-MCNC: 2.3 G/DL (ref 1.5–4.5)
GLUCOSE SERPL-MCNC: 115 MG/DL (ref 65–99)
HBA1C MFR BLD: 5 % (ref 4.8–5.6)
HCV AB S/CO SERPL IA: <0.1 S/CO RATIO (ref 0–0.9)
HDLC SERPL-MCNC: 90 MG/DL
IMP & REVIEW OF LAB RESULTS: NORMAL
LDLC SERPL CALC-MCNC: 115 MG/DL (ref 0–99)
POTASSIUM SERPL-SCNC: 3.9 MMOL/L (ref 3.5–5.2)
PROT SERPL-MCNC: 6.5 G/DL (ref 6–8.5)
SODIUM SERPL-SCNC: 138 MMOL/L (ref 134–144)
TRIGL SERPL-MCNC: 111 MG/DL (ref 0–149)
VLDLC SERPL CALC-MCNC: 19 MG/DL (ref 5–40)

## 2022-02-09 LAB
CYTOLOGIST CVX/VAG CYTO: NORMAL
CYTOLOGY CVX/VAG DOC CYTO: NORMAL
CYTOLOGY CVX/VAG DOC THIN PREP: NORMAL
DX ICD CODE: NORMAL
HPV I/H RISK 4 DNA CVX QL PROBE+SIG AMP: NEGATIVE
Lab: NORMAL
OTHER STN SPEC: NORMAL
STAT OF ADQ CVX/VAG CYTO-IMP: NORMAL

## 2022-02-10 NOTE — PROGRESS NOTES
Pap NILM, HPV neg  CMP and A1c ok  Lipid panel, elevated LDL and tchol but calculated risk low  Hep C neg

## 2022-03-14 ENCOUNTER — TELEPHONE (OUTPATIENT)
Dept: FAMILY MEDICINE CLINIC | Age: 47
End: 2022-03-14

## 2022-03-14 DIAGNOSIS — Z12.31 ENCOUNTER FOR SCREENING MAMMOGRAM FOR MALIGNANT NEOPLASM OF BREAST: Primary | ICD-10-CM

## 2022-03-14 NOTE — TELEPHONE ENCOUNTER
jarad Alvarez from [facility] wanted to request a new mammogram order diagnosis code for Pt.   Please and thank you      -Hayley Boyle

## 2022-03-18 PROBLEM — F31.9 BIPOLAR 1 DISORDER (HCC): Status: ACTIVE | Noted: 2019-04-06

## 2022-05-05 ENCOUNTER — HOSPITAL ENCOUNTER (OUTPATIENT)
Dept: MAMMOGRAPHY | Age: 47
Discharge: HOME | End: 2022-05-05
Attending: STUDENT IN AN ORGANIZED HEALTH CARE EDUCATION/TRAINING PROGRAM
Payer: MEDICARE

## 2022-05-05 DIAGNOSIS — Z12.39 SCREENING BREAST EXAMINATION: ICD-10-CM

## 2022-05-05 PROCEDURE — 77067 SCR MAMMO BI INCL CAD: CPT

## 2022-07-20 ENCOUNTER — TELEPHONE (OUTPATIENT)
Dept: FAMILY MEDICINE CLINIC | Age: 47
End: 2022-07-20

## 2022-07-20 NOTE — TELEPHONE ENCOUNTER
----- Message from [facility] MARIANELA Santoro sent at 7/20/2022  1:06 PM EDT -----  Subject: Message to Provider    QUESTIONS  Information for Provider? The pt called and stated that a nurse informed   her that she needed to go to the hospital to have some testing done and   she stated that she is not ill and is refusing to have it done. The pt   would like someone to follow up and explain to her the test reasoning   ---------------------------------------------------------------------------  --------------  [id number] [employer]  [phone]; OK to leave message on voicemail  ---------------------------------------------------------------------------  --------------  SCRIPT ANSWERS  Relationship to Patient?  Self

## 2022-08-07 RX ORDER — ARIPIPRAZOLE 15 MG/1
15 TABLET ORAL
COMMUNITY

## 2022-08-07 RX ORDER — OXCARBAZEPINE 600 MG/1
600 TABLET, FILM COATED ORAL 2 TIMES DAILY
COMMUNITY

## 2022-08-08 NOTE — PERIOP NOTES
Patient had notified me that she did not want to receive any phone calls for upcoming appointments/procedures. Patient informed me that she plans to contact the authorities if we don't implement her request. Informed patient I would make notation in her chart and that she needs to request the phone numbers to be removed from her chart when she registers for her procedure on Tuesday to prevent future reminders.

## 2022-08-09 ENCOUNTER — HOSPITAL ENCOUNTER (OUTPATIENT)
Age: 47
Setting detail: OUTPATIENT SURGERY
Discharge: HOME | End: 2022-08-09
Attending: INTERNAL MEDICINE | Admitting: INTERNAL MEDICINE
Payer: MEDICARE

## 2022-08-09 ENCOUNTER — ANESTHESIA EVENT (OUTPATIENT)
Dept: ENDOSCOPY | Age: 47
End: 2022-08-09
Payer: MEDICARE

## 2022-08-09 ENCOUNTER — ANESTHESIA (OUTPATIENT)
Dept: ENDOSCOPY | Age: 47
End: 2022-08-09
Payer: MEDICARE

## 2022-08-09 VITALS
OXYGEN SATURATION: 100 % | SYSTOLIC BLOOD PRESSURE: 115 MMHG | BODY MASS INDEX: 28.43 KG/M2 | TEMPERATURE: 98.2 F | DIASTOLIC BLOOD PRESSURE: 71 MMHG | HEART RATE: 71 BPM | HEIGHT: 65 IN | RESPIRATION RATE: 17 BRPM | WEIGHT: 170.64 LBS

## 2022-08-09 LAB — HCG UR QL: NEGATIVE

## 2022-08-09 PROCEDURE — 88305 TISSUE EXAM BY PATHOLOGIST: CPT

## 2022-08-09 PROCEDURE — 2709999900 HC NON-CHARGEABLE SUPPLY: Performed by: INTERNAL MEDICINE

## 2022-08-09 PROCEDURE — 76040000019: Performed by: INTERNAL MEDICINE

## 2022-08-09 PROCEDURE — 77030013992 HC SNR POLYP ENDOSC BSC -B: Performed by: INTERNAL MEDICINE

## 2022-08-09 PROCEDURE — 81025 URINE PREGNANCY TEST: CPT

## 2022-08-09 PROCEDURE — 74011250636 HC RX REV CODE- 250/636: Performed by: NURSE ANESTHETIST, CERTIFIED REGISTERED

## 2022-08-09 PROCEDURE — 74011000250 HC RX REV CODE- 250: Performed by: NURSE ANESTHETIST, CERTIFIED REGISTERED

## 2022-08-09 PROCEDURE — 76060000031 HC ANESTHESIA FIRST 0.5 HR: Performed by: INTERNAL MEDICINE

## 2022-08-09 RX ORDER — EPINEPHRINE 0.1 MG/ML
1 INJECTION INTRACARDIAC; INTRAVENOUS
Status: DISCONTINUED | OUTPATIENT
Start: 2022-08-09 | End: 2022-08-09 | Stop reason: HOSPADM

## 2022-08-09 RX ORDER — PROPOFOL 10 MG/ML
INJECTION, EMULSION INTRAVENOUS
Status: DISCONTINUED | OUTPATIENT
Start: 2022-08-09 | End: 2022-08-09 | Stop reason: HOSPADM

## 2022-08-09 RX ORDER — LIDOCAINE HYDROCHLORIDE 20 MG/ML
INJECTION, SOLUTION EPIDURAL; INFILTRATION; INTRACAUDAL; PERINEURAL AS NEEDED
Status: DISCONTINUED | OUTPATIENT
Start: 2022-08-09 | End: 2022-08-09 | Stop reason: HOSPADM

## 2022-08-09 RX ORDER — NALOXONE HYDROCHLORIDE 0.4 MG/ML
0.4 INJECTION, SOLUTION INTRAMUSCULAR; INTRAVENOUS; SUBCUTANEOUS
Status: DISCONTINUED | OUTPATIENT
Start: 2022-08-09 | End: 2022-08-09 | Stop reason: HOSPADM

## 2022-08-09 RX ORDER — FLUMAZENIL 0.1 MG/ML
0.2 INJECTION INTRAVENOUS
Status: DISCONTINUED | OUTPATIENT
Start: 2022-08-09 | End: 2022-08-09 | Stop reason: HOSPADM

## 2022-08-09 RX ORDER — MIDAZOLAM HYDROCHLORIDE 1 MG/ML
.25-5 INJECTION, SOLUTION INTRAMUSCULAR; INTRAVENOUS
Status: DISCONTINUED | OUTPATIENT
Start: 2022-08-09 | End: 2022-08-09 | Stop reason: HOSPADM

## 2022-08-09 RX ORDER — ATROPINE SULFATE 0.1 MG/ML
0.4 INJECTION INTRAVENOUS
Status: DISCONTINUED | OUTPATIENT
Start: 2022-08-09 | End: 2022-08-09 | Stop reason: HOSPADM

## 2022-08-09 RX ORDER — DEXTROMETHORPHAN/PSEUDOEPHED 2.5-7.5/.8
1.2 DROPS ORAL
Status: DISCONTINUED | OUTPATIENT
Start: 2022-08-09 | End: 2022-08-09 | Stop reason: HOSPADM

## 2022-08-09 RX ORDER — PROPOFOL 10 MG/ML
INJECTION, EMULSION INTRAVENOUS AS NEEDED
Status: DISCONTINUED | OUTPATIENT
Start: 2022-08-09 | End: 2022-08-09 | Stop reason: HOSPADM

## 2022-08-09 RX ORDER — SODIUM CHLORIDE 9 MG/ML
50 INJECTION, SOLUTION INTRAVENOUS CONTINUOUS
Status: DISCONTINUED | OUTPATIENT
Start: 2022-08-09 | End: 2022-08-09 | Stop reason: HOSPADM

## 2022-08-09 RX ADMIN — PROPOFOL 120 MCG/KG/MIN: 10 INJECTION, EMULSION INTRAVENOUS at 10:54

## 2022-08-09 RX ADMIN — LIDOCAINE HYDROCHLORIDE 40 MG: 20 INJECTION, SOLUTION EPIDURAL; INFILTRATION; INTRACAUDAL; PERINEURAL at 10:54

## 2022-08-09 RX ADMIN — PROPOFOL 80 MG: 10 INJECTION, EMULSION INTRAVENOUS at 10:54

## 2022-08-09 NOTE — PERIOP NOTES
Initial RN admission and assessment performed and documented in Endoscopy navigator. Patient evaluated by anesthesia in pre procedure holding. All procedural vital signs airway assessment, and level of consciousness information monitored and recorded by anesthesia staff on the anesthesia record. Report received from CRNA post procedure. Patient transported to recovery area by RN.     Endoscope was pre cleaned at bedside immediately following procedure by Pippa Loving RN

## 2022-08-09 NOTE — H&P
Kelly Medina M.D.  [phone]            History and Physical       NAME:  Frederick Dhillon   :   1975   MRN:   [id number]       Referring Physician:  Kelly Wright DO      Consult Date: 2022 10:16 AM    Chief Complaint:  Colon cancer screening    History of Present Illness:  Patient is a 49 y.o. who is seen for colon cancer screening. Denies any ongoing GI complaints. PMH:  Past Medical History:   Diagnosis Date    Bipolar 1 disorder ([address]     Depression     Family history of colon cancer in father     Intellectual disability        PSH:  History reviewed. No pertinent surgical history. Allergies:  No Known Allergies    Home Medications:  Prior to Admission Medications   Prescriptions Last Dose Informant Patient Reported? Taking? ARIPiprazole (ABILIFY) 10 mg tablet 2022  Yes Yes   Sig: Take 10 mg by mouth Every morning. ARIPiprazole (ABILIFY) 15 mg tablet 2022  Yes Yes   Sig: Take 15 mg by mouth nightly. OXcarbazepine (TRILEPTAL) 600 mg tablet 2022  Yes Yes   Sig: Take 600 mg by mouth two (2) times a day. buPROPion SR (WELLBUTRIN SR) 150 mg SR tablet 2022  Yes Yes   Sig: Take 150 mg by mouth daily. drospirenone-ethinyl estradioL (FRANKO) 3-0.02 mg tab 2022  No Yes   Sig: TAKE 1 TABLET BY MOUTH EVERY DAY   naproxen sodium (NAPROSYN) 220 mg tablet Unknown  Yes No   Sig: Take 220 mg by mouth as needed. topiramate (TOPAMAX) 100 mg tablet 2022  Yes Yes   Sig: Take 100 mg by mouth two (2) times a day. Facility-Administered Medications: None       Hospital Medications:  No current facility-administered medications for this encounter.        Social History:  Social History     Tobacco Use    Smoking status: Never    Smokeless tobacco: Never   Substance Use Topics    Alcohol use: No       Family History:  Family History   Problem Relation Age of Onset    Depression Mother     Cancer Father         colon             Review of Systems:      Constitutional: negative fever, negative chills, negative weight loss  Eyes:   negative visual changes  ENT:   negative sore throat, tongue or lip swelling  Respiratory:  negative cough, negative dyspnea  Cards:  negative for chest pain, palpitations, lower extremity edema  GI:   See HPI  :  negative for frequency, dysuria  Integument:  negative for rash and pruritus  Heme:  negative for easy bruising and gum/nose bleeding  Musculoskel: negative for myalgias,  back pain and muscle weakness  Neuro: negative for headaches, dizziness, vertigo  Psych:  negative for feelings of anxiety, depression       Objective:   No data found. No intake/output data recorded. No intake/output data recorded. EXAM:     NEURO-a&o   HEENT-wnl   LUNGS-clear    COR-regular rate and rhythym     ABD-soft , no tenderness, no rebound, good bs     EXT-no edema     Data Review     No results for input(s): WBC, HGB, HCT, PLT, HGBEXT, HCTEXT, PLTEXT in the last 72 hours. No results for input(s): NA, K, CL, CO2, BUN, CREA, GLU, PHOS, CA in the last 72 hours. No results for input(s): AP, TBIL, TP, ALB, GLOB, GGT, AML, LPSE in the last 72 hours. No lab exists for component: SGOT, GPT, AMYP, HLPSE  No results for input(s): INR, PTP, APTT, INREXT in the last 72 hours. Patient Active Problem List   Diagnosis Code    Metrorrhagia N92.1    Depression F32. A    Intellectual disability F79    Bipolar 1 disorder (HCC) F31.9      Assessment:     Family history of colon cancer  Colon cancer screening   Plan:     Colonoscopy today.      Signed By: Shoshana Runner, MD     8/9/2022  10:16 AM

## 2022-08-09 NOTE — ANESTHESIA PREPROCEDURE EVALUATION
Relevant Problems   NEUROLOGY   (+) Bipolar 1 disorder (HCC)   (+) Depression   (+) Intellectual disability       Anesthetic History   No history of anesthetic complications            Review of Systems / Medical History  Patient summary reviewed, nursing notes reviewed and pertinent labs reviewed    Pulmonary  Within defined limits                 Neuro/Psych   Within defined limits          Comments: Intellectual disabilty Cardiovascular  Within defined limits                     GI/Hepatic/Renal  Within defined limits              Endo/Other  Within defined limits           Other Findings              Physical Exam    Airway  Mallampati: II  TM Distance: 4 - 6 cm  Neck ROM: normal range of motion   Mouth opening: Normal     Cardiovascular    Rhythm: regular  Rate: normal         Dental  No notable dental hx       Pulmonary  Breath sounds clear to auscultation               Abdominal         Other Findings            Anesthetic Plan    ASA: 2  Anesthesia type: MAC            Anesthetic plan and risks discussed with: Patient

## 2022-08-09 NOTE — PROGRESS NOTES
Yessenia Kim  1975  [id number]    Situation:  Verbal report received from:   Tarik Dana   Procedure: Procedure(s):  COLONOSCOPY  ENDOSCOPIC POLYPECTOMY    Background:    Preoperative diagnosis: FX HX OF COLON CA  Postoperative diagnosis: 1. Diverticulosis  2. Colonic polyp's    :  Dr. Jimbo Barajas  Assistant(s): Endoscopy RN-1: Shaquille Stark RN  Endoscopy RN-2: Holger Mederos RN; Kris Neri RN    Specimens:   ID Type Source Tests Collected by Time Destination   1 : polyp's Preservative Colon, Ascending  Shireen Apgar, MD 2022 1102 Pathology   2 : polyp Preservative Rectum  Shireen Apgar, MD 2022 1108 Pathology     H. Pylori  no    Assessment:  Intra-procedure medications     Anesthesia gave intra-procedure sedation and medications, see anesthesia flow sheet yes    Intravenous fluids: NS@ KVO     Vital signs stable   yes    Abdominal assessment: round and soft   yes    Recommendation:  Discharge patient per MD order  yes.   Return to floor  outpatient  Family or Friend   mom  Permission to share finding with family or friend yes

## 2022-08-09 NOTE — DISCHARGE INSTRUCTIONS
[address] [facility]. Lencho Rainey M.D.  [phone]          COLON DISCHARGE INSTRUCTIONS       2022    Cleo Alegria  :  1975  [facility] Medical Record Number:  [id number]      COLONOSCOPY FINDINGS:  Your colonoscopy showed: 3 polyps and diverticulosis. DISCOMFORT:  Redness at IV site- apply warm compress to area; if redness or soreness persist- contact your physician  There may be a slight amount of blood passed from the rectum  Gaseous discomfort- walking, belching will help relieve any discomfort  You may not operate a vehicle for 12 hours  You may not engage in an occupation involving machinery or appliances for rest of today  You may not drink alcoholic beverages for at least 12 hours  Avoid making any critical decisions for at least 24 hour  DIET:   High fiber diet. - however -  remember your colon is empty and a heavy meal will produce gas. Avoid these foods:  vegetables, fried / greasy foods, carbonated drinks for today     ACTIVITY:  You may resume your normal daily activities it is recommended that you spend the remainder of the day resting -  avoid any strenuous activity. CALL M.DNoemí ANY SIGN OF:   Increasing pain, nausea, vomiting  Abdominal distension (swelling)  New increased bleeding (oral or rectal)  Fever (chills)  Pain in chest area  Bloody discharge from nose or mouth   Shortness of breath    Follow-up Instructions:   Call Dr. Jenni Mcdaniels if any questions or problems. Telephone # [phone]  Biopsy results will be available in  5 to 7 days  Should have a repeat colonoscopy in 3-5 years.

## 2022-08-09 NOTE — PROGRESS NOTES
Endoscopy discharge instructions have been reviewed and given to patient. The patient verbalized understanding and acceptance of instructions. Dr. Erik Cook discussed with patient procedure findings and next steps.

## 2022-08-09 NOTE — PROCEDURES
Muriel Scanlon M.D.  [phone]            2022          Colonoscopy Operative Report  Nayeli Jack  :  1975  [facility] Medical Record Number:  [id number]      Indications:    Family history of coloretal cancer (screening only)     :  Chad Huang MD    Assistant -- None  Implants -- None    Referring Provider: Edwin Garcias DO    Sedation:  MAC anesthesia Propofol    Pre-Procedural Exam:      Airway: clear,  No airway problems anticipated  Heart: RRR, without gallops or rubs  Lungs: clear bilaterally without wheezes, crackles, or rhonchi  Abdomen: soft, nontender, nondistended, bowel sounds present  Mental Status: awake, alert and oriented to person, place and time     Procedure Details:  After informed consent was obtained with all risks and benefits of procedure explained and preoperative exam completed, the patient was taken to the endoscopy suite and placed in the left lateral decubitus position. Upon sequential sedation as per above, a digital rectal exam was performed. The Olympus videocolonoscope  was inserted in the rectum and carefully advanced to the terminal ileum. The quality of preparation was good. The colonoscope was slowly withdrawn with careful inspection and evaluation between folds. Retroflexion in the rectum was performed. Findings:   Terminal Ileum: normal  Cecum: normal  Ascending Colon: 2  Sessile polyp(s), the largest 8 mm in size;  Transverse Colon: normal  Descending Colon: normal  Sigmoid:     - Diverticulosis  Rectum: 1  Sessile polyp(s), the largest 6 mm in size; Interventions:  3 complete polypectomy were performed using cold snare  and the polyps were  retrieved    Specimen Removed:  specimen #1, 6-8 mm in size, located in the ascending colon and the rectum removed by cold snare and retrieved for pathology    Complications: None. EBL:  None.     Impression:  3 polyps removed as above                        Mild sigmoid diverticulosis on exam    Recommendations:  -Await pathology. -If adenoma is present, repeat colonoscopy in 3 years. Discharge Disposition:  Home in the company of a  when able to ambulate.     Cb Munoz MD  8/9/2022  11:11 AM

## 2022-08-09 NOTE — ANESTHESIA POSTPROCEDURE EVALUATION
Procedure(s):  COLONOSCOPY  ENDOSCOPIC POLYPECTOMY. MAC    Anesthesia Post Evaluation        Patient location during evaluation: bedside  Level of consciousness: awake  Pain management: satisfactory to patient  Airway patency: patent  Anesthetic complications: no  Cardiovascular status: acceptable  Respiratory status: acceptable  Hydration status: acceptable        INITIAL Post-op Vital signs:   Vitals Value Taken Time   /71 08/09/22 1130   Temp 36.8 °C (98.2 °F) 08/09/22 1120   Pulse 71 08/09/22 1134   Resp 20 08/09/22 1134   SpO2 90 % 08/09/22 1135   Vitals shown include unvalidated device data.

## 2022-08-30 DIAGNOSIS — Z01.419 WELL WOMAN EXAM: ICD-10-CM

## 2022-09-07 ENCOUNTER — TELEPHONE (OUTPATIENT)
Dept: FAMILY MEDICINE CLINIC | Age: 47
End: 2022-09-07

## 2022-09-07 RX ORDER — DROSPIRENONE AND ETHINYL ESTRADIOL 0.02-3(28)
KIT ORAL
Qty: 28 TABLET | Refills: 5 | Status: SHIPPED | OUTPATIENT
Start: 2022-09-07

## 2022-09-07 NOTE — TELEPHONE ENCOUNTER
Called patient to discuss refill request. On trileptal which appears to be new med since well woman visit. Wanted to ensure patient was using back up method of contraception. Left VM. Will send in refill for her OCP.     Silva Early,   PGY-2 Resident  [address] Medicine

## 2022-11-20 ENCOUNTER — HOSPITAL ENCOUNTER (EMERGENCY)
Age: 47
Discharge: HOME | End: 2022-11-20
Attending: EMERGENCY MEDICINE
Payer: MEDICARE

## 2022-11-20 ENCOUNTER — APPOINTMENT (OUTPATIENT)
Dept: CT IMAGING | Age: 47
End: 2022-11-20
Attending: EMERGENCY MEDICINE
Payer: MEDICARE

## 2022-11-20 VITALS
SYSTOLIC BLOOD PRESSURE: 109 MMHG | BODY MASS INDEX: 28.43 KG/M2 | WEIGHT: 170.64 LBS | HEART RATE: 87 BPM | RESPIRATION RATE: 16 BRPM | TEMPERATURE: 97.8 F | HEIGHT: 65 IN | OXYGEN SATURATION: 99 % | DIASTOLIC BLOOD PRESSURE: 73 MMHG
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
## 2022-11-20 DIAGNOSIS — R42 DIZZINESS: Primary | ICD-10-CM

## 2022-11-20 LAB
ALBUMIN SERPL-MCNC: 3.6 G/DL (ref 3.5–5)
ALBUMIN/GLOB SERPL: 1.2 {RATIO} (ref 1.1–2.2)
ALP SERPL-CCNC: 68 U/L (ref 45–117)
ALT SERPL-CCNC: 22 U/L (ref 12–78)
AMORPH CRY URNS QL MICRO: ABNORMAL
ANION GAP SERPL CALC-SCNC: 7 MMOL/L (ref 5–15)
APPEARANCE UR: ABNORMAL
AST SERPL-CCNC: 12 U/L (ref 15–37)
BACTERIA URNS QL MICRO: ABNORMAL /HPF
BASOPHILS # BLD: 0 K/UL (ref 0–0.1)
BASOPHILS NFR BLD: 1 % (ref 0–1)
BILIRUB SERPL-MCNC: 0.3 MG/DL (ref 0.2–1)
BILIRUB UR QL: NEGATIVE
BUN SERPL-MCNC: 17 MG/DL (ref 6–20)
BUN/CREAT SERPL: 27 (ref 12–20)
CALCIUM SERPL-MCNC: 8.5 MG/DL (ref 8.5–10.1)
CHLORIDE SERPL-SCNC: 106 MMOL/L (ref 97–108)
CO2 SERPL-SCNC: 23 MMOL/L (ref 21–32)
COLOR UR: ABNORMAL
COMMENT, HOLDF: NORMAL
CREAT SERPL-MCNC: 0.63 MG/DL (ref 0.55–1.02)
DIFFERENTIAL METHOD BLD: NORMAL
EOSINOPHIL # BLD: 0.1 K/UL (ref 0–0.4)
EOSINOPHIL NFR BLD: 1 % (ref 0–7)
EPITH CASTS URNS QL MICRO: ABNORMAL /LPF
ERYTHROCYTE [DISTWIDTH] IN BLOOD BY AUTOMATED COUNT: 11.9 % (ref 11.5–14.5)
GLOBULIN SER CALC-MCNC: 3.1 G/DL (ref 2–4)
GLUCOSE SERPL-MCNC: 137 MG/DL (ref 65–100)
GLUCOSE UR STRIP.AUTO-MCNC: NEGATIVE MG/DL
HCT VFR BLD AUTO: 40.2 % (ref 35–47)
HGB BLD-MCNC: 14.3 G/DL (ref 11.5–16)
HGB UR QL STRIP: NEGATIVE
IMM GRANULOCYTES # BLD AUTO: 0 K/UL (ref 0–0.04)
IMM GRANULOCYTES NFR BLD AUTO: 0 % (ref 0–0.5)
KETONES UR QL STRIP.AUTO: NEGATIVE MG/DL
LEUKOCYTE ESTERASE UR QL STRIP.AUTO: NEGATIVE
LYMPHOCYTES # BLD: 1.5 K/UL (ref 0.8–3.5)
LYMPHOCYTES NFR BLD: 30 % (ref 12–49)
MCH RBC QN AUTO: 33.5 PG (ref 26–34)
MCHC RBC AUTO-ENTMCNC: 35.6 G/DL (ref 30–36.5)
MCV RBC AUTO: 94.1 FL (ref 80–99)
MONOCYTES # BLD: 0.4 K/UL (ref 0–1)
MONOCYTES NFR BLD: 8 % (ref 5–13)
NEUTS SEG # BLD: 3.1 K/UL (ref 1.8–8)
NEUTS SEG NFR BLD: 60 % (ref 32–75)
NITRITE UR QL STRIP.AUTO: NEGATIVE
NRBC # BLD: 0 K/UL (ref 0–0.01)
NRBC BLD-RTO: 0 PER 100 WBC
PH UR STRIP: 7.5 [PH] (ref 5–8)
PLATELET # BLD AUTO: 150 K/UL (ref 150–400)
PMV BLD AUTO: 10 FL (ref 8.9–12.9)
POTASSIUM SERPL-SCNC: 3.2 MMOL/L (ref 3.5–5.1)
PROT SERPL-MCNC: 6.7 G/DL (ref 6.4–8.2)
PROT UR STRIP-MCNC: NEGATIVE MG/DL
RBC # BLD AUTO: 4.27 M/UL (ref 3.8–5.2)
RBC #/AREA URNS HPF: ABNORMAL /HPF (ref 0–5)
SAMPLES BEING HELD,HOLD: NORMAL
SODIUM SERPL-SCNC: 136 MMOL/L (ref 136–145)
SP GR UR REFRACTOMETRY: <1.005 (ref 1–1.03)
UR CULT HOLD, URHOLD: NORMAL
UROBILINOGEN UR QL STRIP.AUTO: 0.2 EU/DL (ref 0.2–1)
WBC # BLD AUTO: 5.1 K/UL (ref 3.6–11)
WBC URNS QL MICRO: ABNORMAL /HPF (ref 0–4)

## 2022-11-20 PROCEDURE — 99285 EMERGENCY DEPT VISIT HI MDM: CPT

## 2022-11-20 PROCEDURE — 36415 COLL VENOUS BLD VENIPUNCTURE: CPT

## 2022-11-20 PROCEDURE — 70450 CT HEAD/BRAIN W/O DYE: CPT

## 2022-11-20 PROCEDURE — 74011250636 HC RX REV CODE- 250/636: Performed by: EMERGENCY MEDICINE

## 2022-11-20 PROCEDURE — 85025 COMPLETE CBC W/AUTO DIFF WBC: CPT

## 2022-11-20 PROCEDURE — 81001 URINALYSIS AUTO W/SCOPE: CPT

## 2022-11-20 PROCEDURE — 80053 COMPREHEN METABOLIC PANEL: CPT

## 2022-11-20 PROCEDURE — 70496 CT ANGIOGRAPHY HEAD: CPT

## 2022-11-20 PROCEDURE — 74011000636 HC RX REV CODE- 636: Performed by: RADIOLOGY

## 2022-11-20 RX ORDER — MECLIZINE HYDROCHLORIDE 25 MG/1
50 TABLET ORAL
Status: COMPLETED | OUTPATIENT
Start: 2022-11-20 | End: 2022-11-20

## 2022-11-20 RX ORDER — MECLIZINE HYDROCHLORIDE 25 MG/1
25 TABLET ORAL
Qty: 20 TABLET | Refills: 0 | Status: SHIPPED | OUTPATIENT
Start: 2022-11-20

## 2022-11-20 RX ADMIN — IOPAMIDOL 100 ML: 755 INJECTION, SOLUTION INTRAVENOUS at 06:35

## 2022-11-20 RX ADMIN — MECLIZINE HYDROCHLORIDE 50 MG: 25 TABLET ORAL at 05:45

## 2022-11-20 NOTE — ED PROVIDER NOTES
Sign out received from Dr. Karla Zhang. Pending UA.    8:43 AM  UA is negative for infection. Workup overall unremarkable. Patient reevaluated- feeling better at this time, no longer dizzy after meclizine. Will dc in stable and improved condition with rx for meclizine to follow up with PCP.

## 2022-11-20 NOTE — ED PROVIDER NOTES
Pt is a 49 yr old w intellectual disability and bipolar disorder. She reports that when she awoke this morning the room was spinning. She was walking and lost her balance as a result of the dizziness and hit her head. The history is provided by the patient. Fall  The accident occurred Less than 1 hour ago. The fall occurred while walking. She fell from a height of ground level. Impact surface: wall. The point of impact was the head. The patient is experiencing no pain. She was Ambulatory at the scene. Pertinent negatives include no numbness, no vomiting, no headaches and no loss of consciousness. The symptoms are aggravated by activity. She has tried nothing for the symptoms. The treatment provided no relief.       Past Medical History:   Diagnosis Date    Bipolar 1 disorder ([facility] [address]     Depression     Family history of colon cancer in father     Intellectual disability        Past Surgical History:   Procedure Laterality Date    COLONOSCOPY N/A 8/9/2022    COLONOSCOPY performed by Marla Redd MD at [facility] ENDOSCOPY    HX WISDOM TEETH EXTRACTION      early 25s         Family History:   Problem Relation Age of Onset    Depression Mother     Cancer Father         colon       Social History     Socioeconomic History    Marital status:      Spouse name: Not on file    Number of children: Not on file    Years of education: Not on file    Highest education level: Not on file   Occupational History    Not on file   Tobacco Use    Smoking status: Never    Smokeless tobacco: Never   Vaping Use    Vaping Use: Never used   Substance and Sexual Activity    Alcohol use: No    Drug use: Never    Sexual activity: Yes     Partners: Male     Birth control/protection: Pill   Other Topics Concern    Not on file   Social History Narrative    Not on file     Social Determinants of Health     Financial Resource Strain: Not on file   Food Insecurity: Not on file   Transportation Needs: Not on file   Physical Activity: Not on file   Stress: Not on file   Social Connections: Not on file   Intimate Partner Violence: Not on file   Housing Stability: Not on file         ALLERGIES: Patient has no known allergies. Review of Systems   Respiratory:  Negative for shortness of breath, wheezing and stridor. Gastrointestinal:  Negative for vomiting. Neurological:  Positive for dizziness. Negative for loss of consciousness, syncope, weakness, light-headedness, numbness and headaches. Vitals:    11/20/22 0525 11/20/22 0531   BP: 128/73    Pulse: 74    Resp: 16    Temp: 97.8 °F (36.6 °C)    SpO2: 97% 97%   Weight: 77.4 kg (170 lb 10.2 oz)    Height: 5' 5\" (1.651 m)             Physical Exam  Vitals and nursing note reviewed. Constitutional:       Appearance: She is well-developed. HENT:      Head: Normocephalic and atraumatic. Eyes:      Pupils: Pupils are equal, round, and reactive to light. Cardiovascular:      Rate and Rhythm: Normal rate. Pulmonary:      Effort: Pulmonary effort is normal.   Abdominal:      General: There is no distension. Palpations: Abdomen is soft. Tenderness: There is no abdominal tenderness. Musculoskeletal:      Cervical back: Normal range of motion and neck supple. Skin:     General: Skin is warm. Capillary Refill: Capillary refill takes less than 2 seconds. Neurological:      General: No focal deficit present. Mental Status: She is alert and oriented to person, place, and time. Psychiatric:         Mood and Affect: Mood normal.         Behavior: Behavior normal.        MDM     Amount and/or Complexity of Data Reviewed  Decide to obtain previous medical records or to obtain history from someone other than the patient: yes           Procedures    DDX: seizure, meningitis, stroke, sepsis, subarachnoid hemorrhage, intracranial bleeding, encephalitis, UTI    7:06 AM  Change of shift. Care of patient signed over to Dr. Leah Barrera. Bedside handoff complete. Awaiting UA.

## 2022-11-20 NOTE — ED NOTES
Bedside shift change report given to Candace Oneill (oncoming nurse) by RN (offgoing nurse). Report included the following information SBAR and ED Summary.

## 2022-11-20 NOTE — ED TRIAGE NOTES
EMS reports pt fell at home, hit her head on wall, and was repeating herself. Pt's mother reported to EMS pt normally repeats herself.

## 2022-11-20 NOTE — ED NOTES
Verbal shift change report given to DG Main (oncoming nurse) by Nikki Luther (offgoing nurse). Report included the following information SBAR, ED Summary, Procedure Summary and MAR.

## 2022-11-20 NOTE — ED NOTES
Spoke with pt's mother Herbert nesbitt on the phone, and she reports pt normally has intellectual disability, and also Bipolar, and repeats herself when she gets nervous.

## 2023-02-28 DIAGNOSIS — Z01.419 WELL WOMAN EXAM: ICD-10-CM

## 2023-03-02 RX ORDER — DROSPIRENONE AND ETHINYL ESTRADIOL 0.02-3(28)
KIT ORAL
Qty: 28 TABLET | Refills: 1 | Status: SHIPPED | OUTPATIENT
Start: 2023-03-02

## 2023-05-09 DIAGNOSIS — Z01.419 ENCOUNTER FOR GYNECOLOGICAL EXAMINATION (GENERAL) (ROUTINE) WITHOUT ABNORMAL FINDINGS: ICD-10-CM

## 2023-05-11 RX ORDER — DROSPIRENONE AND ETHINYL ESTRADIOL 0.02-3(28)
KIT ORAL
Qty: 28 TABLET | Refills: 1 | OUTPATIENT
Start: 2023-05-11

## 2023-06-09 ENCOUNTER — OFFICE VISIT (OUTPATIENT)
Age: 48
End: 2023-06-09

## 2023-06-09 VITALS
DIASTOLIC BLOOD PRESSURE: 63 MMHG | WEIGHT: 173.4 LBS | SYSTOLIC BLOOD PRESSURE: 97 MMHG | BODY MASS INDEX: 28.89 KG/M2 | TEMPERATURE: 98.7 F | HEART RATE: 84 BPM | RESPIRATION RATE: 18 BRPM | HEIGHT: 65 IN | OXYGEN SATURATION: 97 %

## 2023-06-09 DIAGNOSIS — R73.09 ABNORMAL GLUCOSE: ICD-10-CM

## 2023-06-09 DIAGNOSIS — Z00.00 INITIAL MEDICARE ANNUAL WELLNESS VISIT: ICD-10-CM

## 2023-06-09 DIAGNOSIS — N94.89 SUPPRESSION OF MENSES: ICD-10-CM

## 2023-06-09 DIAGNOSIS — Z11.4 SCREENING FOR HIV WITHOUT PRESENCE OF RISK FACTORS: ICD-10-CM

## 2023-06-09 DIAGNOSIS — Z12.31 ENCOUNTER FOR SCREENING MAMMOGRAM FOR MALIGNANT NEOPLASM OF BREAST: Primary | ICD-10-CM

## 2023-06-09 DIAGNOSIS — Z13.1 SCREENING FOR DIABETES MELLITUS: ICD-10-CM

## 2023-06-09 DIAGNOSIS — Z13.220 SCREENING FOR LIPID DISORDERS: ICD-10-CM

## 2023-06-09 RX ORDER — DROSPIRENONE AND ETHINYL ESTRADIOL 0.02-3(28)
1 KIT ORAL DAILY
Qty: 1 PACKET | Refills: 3 | Status: SHIPPED | OUTPATIENT
Start: 2023-06-09

## 2023-06-09 SDOH — ECONOMIC STABILITY: FOOD INSECURITY: WITHIN THE PAST 12 MONTHS, YOU WORRIED THAT YOUR FOOD WOULD RUN OUT BEFORE YOU GOT MONEY TO BUY MORE.: NEVER TRUE

## 2023-06-09 SDOH — ECONOMIC STABILITY: HOUSING INSECURITY
IN THE LAST 12 MONTHS, WAS THERE A TIME WHEN YOU DID NOT HAVE A STEADY PLACE TO SLEEP OR SLEPT IN A SHELTER (INCLUDING NOW)?: NO

## 2023-06-09 SDOH — ECONOMIC STABILITY: FOOD INSECURITY: WITHIN THE PAST 12 MONTHS, THE FOOD YOU BOUGHT JUST DIDN'T LAST AND YOU DIDN'T HAVE MONEY TO GET MORE.: NEVER TRUE

## 2023-06-09 SDOH — ECONOMIC STABILITY: INCOME INSECURITY: HOW HARD IS IT FOR YOU TO PAY FOR THE VERY BASICS LIKE FOOD, HOUSING, MEDICAL CARE, AND HEATING?: NOT HARD AT ALL

## 2023-06-09 ASSESSMENT — LIFESTYLE VARIABLES
HOW MANY STANDARD DRINKS CONTAINING ALCOHOL DO YOU HAVE ON A TYPICAL DAY: PATIENT DOES NOT DRINK
HOW OFTEN DO YOU HAVE A DRINK CONTAINING ALCOHOL: NEVER

## 2023-06-09 ASSESSMENT — PATIENT HEALTH QUESTIONNAIRE - PHQ9
SUM OF ALL RESPONSES TO PHQ QUESTIONS 1-9: 0
SUM OF ALL RESPONSES TO PHQ9 QUESTIONS 1 & 2: 0
SUM OF ALL RESPONSES TO PHQ QUESTIONS 1-9: 0
SUM OF ALL RESPONSES TO PHQ QUESTIONS 1-9: 0
2. FEELING DOWN, DEPRESSED OR HOPELESS: 0
1. LITTLE INTEREST OR PLEASURE IN DOING THINGS: 0
SUM OF ALL RESPONSES TO PHQ QUESTIONS 1-9: 0

## 2023-06-09 NOTE — PROGRESS NOTES
Medicare Annual Wellness Visit    Stacy Ramirez is here for Annual Exam (Referral for mammogram needed. /Birth control refill)  No complaints today. Requesting mammogram referral.     Assessment & Plan   Encounter for screening mammogram for malignant neoplasm of breast  -     LISA DIGITAL SCREEN W OR WO CAD BILATERAL; Future  Screening for lipid disorders  -     Lipid Panel; Future  Screening for diabetes mellitus  -     Basic Metabolic Panel; Future  -     Hemoglobin A1C; Future  Suppression of menses  -     drospirenone-ethinyl estradiol (MARITA) 3-0.02 MG per tablet; Take 1 tablet by mouth daily, Disp-1 packet, R-3Normal  -     AMB POC URINE PREGNANCY TEST, VISUAL COLOR COMPARISON  Screening for HIV without presence of risk factors  -     HIV 1/2 Ag/Ab, 4TH Generation,W Rflx Confirm; Future  BMI 28.0-28.9,adult  -     Hemoglobin A1C; Future  -     Lipid Panel; Future  Abnormal glucose  -     Basic Metabolic Panel; Future  -     Hemoglobin A1C; Future  -     Lipid Panel; Future  Initial Medicare annual wellness visit  -     Basic Metabolic Panel; Future  -     Hemoglobin A1C; Future  -     Lipid Panel; Future  -     LISA DIGITAL SCREEN W OR WO CAD BILATERAL; Future  -     drospirenone-ethinyl estradiol (MARITA) 3-0.02 MG per tablet; Take 1 tablet by mouth daily, Disp-1 packet, R-3Normal  -     HIV 1/2 Ag/Ab, 4TH Generation,W Rflx Confirm; Future    Recommendations for Preventive Services Due: see orders and patient instructions/AVS.  Recommended screening schedule for the next 5-10 years is provided to the patient in written form: see Patient Instructions/AVS.     Return in 1 year (on 6/9/2024). Subjective   The following acute and/or chronic problems were also addressed today:  Bipolar disorder and Depression. Patient is doing well, mood has been stable. No feelings of sadness. No SI/HI. On Topomax, Wellbutrin, Abilify and doing well. Denies balance difficulty.      Patient's complete Health Risk Assessment

## 2023-06-09 NOTE — PROGRESS NOTES
Chief Complaint   Patient presents with    Annual Exam     Referral for mammogram needed. Birth control refill     Vitals:    06/09/23 1307   BP: 97/63   Site: Right Upper Arm   Position: Sitting   Cuff Size: Large Adult   Pulse: 84   Resp: 18   Temp: 98.7 °F (37.1 °C)   TempSrc: Temporal   SpO2: 97%   Weight: 173 lb 6.4 oz (78.7 kg)   Height: 5' 5\" (1.651 m)     1. Have you been to the ER, urgent care clinic since your last visit? Hospitalized since your last visit? No    2. Have you seen or consulted any other health care providers outside of the [address] [place] [address] since your last visit? Include any pap smears or colon screening.  No

## 2023-06-09 NOTE — PATIENT INSTRUCTIONS
Wait for an ambulance. Do not try to drive yourself. Watch closely for changes in your health, and be sure to contact your doctor if you have any problems. Where can you learn more? Go to http://www.carrera.com/ and enter F075 to learn more about \"A Healthy Heart: Care Instructions. \"  Current as of: September 7, 2022               Content Version: 13.6  © 2006-2023 [employer]. Care instructions adapted under license by [employer] [facility] ([facility]). If you have questions about a medical condition or this instruction, always ask your healthcare professional. [place] [address] any warranty or liability for your use of this information. Personalized Preventive Plan for Lonn Cast - 6/9/2023  Medicare offers a range of preventive health benefits. Some of the tests and screenings are paid in full while other may be subject to a deductible, co-insurance, and/or copay. Some of these benefits include a comprehensive review of your medical history including lifestyle, illnesses that may run in your family, and various assessments and screenings as appropriate. After reviewing your medical record and screening and assessments performed today your provider may have ordered immunizations, labs, imaging, and/or referrals for you. A list of these orders (if applicable) as well as your Preventive Care list are included within your After Visit Summary for your review. Other Preventive Recommendations:    A preventive eye exam performed by an eye specialist is recommended every 1-2 years to screen for glaucoma; cataracts, macular degeneration, and other eye disorders. A preventive dental visit is recommended every 6 months. Try to get at least 150 minutes of exercise per week or 10,000 steps per day on a pedometer . Order or download the FREE \"Exercise & Physical Activity: Your Everyday Guide\" from [employer].  Call [phone] or search [facility]

## 2023-06-10 LAB
ANION GAP SERPL CALC-SCNC: 4 MMOL/L (ref 5–15)
BUN SERPL-MCNC: 15 MG/DL (ref 6–20)
BUN/CREAT SERPL: 23 (ref 12–20)
CALCIUM SERPL-MCNC: 8.8 MG/DL (ref 8.5–10.1)
CHLORIDE SERPL-SCNC: 102 MMOL/L (ref 97–108)
CHOLEST SERPL-MCNC: 212 MG/DL
CO2 SERPL-SCNC: 27 MMOL/L (ref 21–32)
CREAT SERPL-MCNC: 0.64 MG/DL (ref 0.55–1.02)
EST. AVERAGE GLUCOSE BLD GHB EST-MCNC: 91 MG/DL
GLUCOSE SERPL-MCNC: 93 MG/DL (ref 65–100)
HBA1C MFR BLD: 4.8 % (ref 4–5.6)
HDLC SERPL-MCNC: 69 MG/DL
HDLC SERPL: 3.1 (ref 0–5)
HIV 1+2 AB+HIV1 P24 AG SERPL QL IA: NONREACTIVE
HIV 1/2 RESULT COMMENT: NORMAL
LDLC SERPL CALC-MCNC: 121.8 MG/DL (ref 0–100)
POTASSIUM SERPL-SCNC: 3.8 MMOL/L (ref 3.5–5.1)
SODIUM SERPL-SCNC: 133 MMOL/L (ref 136–145)
TRIGL SERPL-MCNC: 106 MG/DL
VLDLC SERPL CALC-MCNC: 21.2 MG/DL

## 2023-07-13 ENCOUNTER — HOSPITAL ENCOUNTER (OUTPATIENT)
Facility: HOSPITAL | Age: 48
Discharge: HOME | End: 2023-07-13
Payer: MEDICARE

## 2023-07-13 DIAGNOSIS — Z12.31 ENCOUNTER FOR SCREENING MAMMOGRAM FOR MALIGNANT NEOPLASM OF BREAST: ICD-10-CM

## 2023-07-13 DIAGNOSIS — Z00.00 INITIAL MEDICARE ANNUAL WELLNESS VISIT: ICD-10-CM

## 2023-07-13 PROCEDURE — 77067 SCR MAMMO BI INCL CAD: CPT

## 2023-09-07 DIAGNOSIS — Z00.00 INITIAL MEDICARE ANNUAL WELLNESS VISIT: ICD-10-CM

## 2023-09-07 DIAGNOSIS — N94.89 SUPPRESSION OF MENSES: ICD-10-CM

## 2023-09-10 RX ORDER — DROSPIRENONE AND ETHINYL ESTRADIOL 0.02-3(28)
KIT ORAL
Qty: 28 TABLET | Refills: 3 | Status: SHIPPED | OUTPATIENT
Start: 2023-09-10

## 2023-10-11 ENCOUNTER — OFFICE VISIT (OUTPATIENT)
Age: 48
End: 2023-10-11
Payer: MEDICARE

## 2023-10-11 VITALS
TEMPERATURE: 99.5 F | RESPIRATION RATE: 16 BRPM | HEART RATE: 98 BPM | DIASTOLIC BLOOD PRESSURE: 75 MMHG | SYSTOLIC BLOOD PRESSURE: 112 MMHG | OXYGEN SATURATION: 98 %

## 2023-10-11 DIAGNOSIS — R11.0 NAUSEA: ICD-10-CM

## 2023-10-11 DIAGNOSIS — B34.9 VIRAL ILLNESS: Primary | ICD-10-CM

## 2023-10-11 LAB
INFLUENZA A ANTIGEN, POC: NEGATIVE
INFLUENZA B ANTIGEN, POC: NEGATIVE
VALID INTERNAL CONTROL, POC: YES

## 2023-10-11 PROCEDURE — 87804 INFLUENZA ASSAY W/OPTIC: CPT | Performed by: STUDENT IN AN ORGANIZED HEALTH CARE EDUCATION/TRAINING PROGRAM

## 2023-10-11 PROCEDURE — G8428 CUR MEDS NOT DOCUMENT: HCPCS | Performed by: STUDENT IN AN ORGANIZED HEALTH CARE EDUCATION/TRAINING PROGRAM

## 2023-10-11 PROCEDURE — G8484 FLU IMMUNIZE NO ADMIN: HCPCS | Performed by: STUDENT IN AN ORGANIZED HEALTH CARE EDUCATION/TRAINING PROGRAM

## 2023-10-11 PROCEDURE — G8419 CALC BMI OUT NRM PARAM NOF/U: HCPCS | Performed by: STUDENT IN AN ORGANIZED HEALTH CARE EDUCATION/TRAINING PROGRAM

## 2023-10-11 PROCEDURE — 99213 OFFICE O/P EST LOW 20 MIN: CPT | Performed by: STUDENT IN AN ORGANIZED HEALTH CARE EDUCATION/TRAINING PROGRAM

## 2023-10-11 PROCEDURE — 1036F TOBACCO NON-USER: CPT | Performed by: STUDENT IN AN ORGANIZED HEALTH CARE EDUCATION/TRAINING PROGRAM

## 2023-10-11 PROCEDURE — PBSHW AMB POC RAPID INFLUENZA TEST: Performed by: STUDENT IN AN ORGANIZED HEALTH CARE EDUCATION/TRAINING PROGRAM

## 2023-10-12 RX ORDER — PROCHLORPERAZINE MALEATE 5 MG/1
5 TABLET ORAL EVERY 6 HOURS PRN
Qty: 12 TABLET | Refills: 0 | Status: SHIPPED | OUTPATIENT
Start: 2023-10-12

## 2023-10-13 LAB — SARS-COV-2 RNA RESP QL NAA+PROBE: NOT DETECTED

## 2023-11-30 NOTE — ED NOTES
Discharge instructions were reviewed and given to the patient and mother. Patient given a current medication reconciliation form and verbalized understanding of their medications, side affects, medication administration, and time when due. Importance of follow-up and appointment times and dates reviewed. The patient verbalized understanding of discharge instructions and prescriptions, all questions were answered. Patient and mother has no further concerns at this time. Patient stable at time of discharge. Patient is here today for flu shot and TDAP. No complaints. Consent signed. TDAP given in the right deltoid and flu shot given in the left deltoid.    VIS is given

## 2024-01-23 DIAGNOSIS — N94.89 SUPPRESSION OF MENSES: ICD-10-CM

## 2024-01-23 DIAGNOSIS — Z00.00 INITIAL MEDICARE ANNUAL WELLNESS VISIT: ICD-10-CM

## 2024-01-23 RX ORDER — DROSPIRENONE AND ETHINYL ESTRADIOL 0.02-3(28)
KIT ORAL
Qty: 28 TABLET | Refills: 4 | Status: SHIPPED | OUTPATIENT
Start: 2024-01-23

## 2024-01-25 ENCOUNTER — OFFICE VISIT (OUTPATIENT)
Age: 49
End: 2024-01-25
Payer: MEDICARE

## 2024-01-25 VITALS
HEART RATE: 74 BPM | HEIGHT: 65 IN | WEIGHT: 168 LBS | TEMPERATURE: 98 F | SYSTOLIC BLOOD PRESSURE: 111 MMHG | RESPIRATION RATE: 18 BRPM | DIASTOLIC BLOOD PRESSURE: 71 MMHG | OXYGEN SATURATION: 96 % | BODY MASS INDEX: 27.99 KG/M2

## 2024-01-25 DIAGNOSIS — G47.9 SLEEP DISTURBANCE: Primary | ICD-10-CM

## 2024-01-25 DIAGNOSIS — R23.2 HOT FLASHES: ICD-10-CM

## 2024-01-25 PROCEDURE — 99213 OFFICE O/P EST LOW 20 MIN: CPT

## 2024-01-25 PROCEDURE — G8427 DOCREV CUR MEDS BY ELIG CLIN: HCPCS

## 2024-01-25 PROCEDURE — 1036F TOBACCO NON-USER: CPT

## 2024-01-25 PROCEDURE — G8419 CALC BMI OUT NRM PARAM NOF/U: HCPCS

## 2024-01-25 PROCEDURE — G8484 FLU IMMUNIZE NO ADMIN: HCPCS

## 2024-01-25 NOTE — PROGRESS NOTES
Patient has been identified by name and .    Chief Complaint   Patient presents with    Menopause     Patient with menopause sx x a few months. Having trouble sleeping and with hot flashes.        Vitals:    24 1035   BP: 111/71   Site: Right Upper Arm   Position: Sitting   Cuff Size: Medium Adult   Pulse: 74   Resp: 18   Temp: 98 °F (36.7 °C)   TempSrc: Oral   SpO2: 96%   Weight: 76.2 kg (168 lb)   Height: 1.651 m (5' 5\")        1. Have you been to the ER, urgent care clinic since your last visit?  Hospitalized since your last visit?No    2. Have you seen or consulted any other health care providers outside of the [facility] since your last visit?  Include any pap smears or colon screening. No

## 2024-01-25 NOTE — PROGRESS NOTES
[address] [place] [address]   Office [phone], Fax [phone]  Chief Complaint   Patient presents with    Menopause     Patient with menopause sx x a few months. Having trouble sleeping and with hot flashes.       Subjective   Jasmyn Parkinson is an 48 y.o. female who presents for hot flashes and insomnia.     Insomnia: wakes up in the middle of the night and can't fall back asleep. Requesting natural remedies. Watches TV and movies prior to sleep. Uses phone in the middle of the night when she wakes up.    Hot flashes have been present for several months. Reports that the frequency of this has improved. Currently occurring several times a week but not daily.       Review of Systems   Review of Systems See HPI    Allergies   No Known Allergies    Medications  Current Outpatient Medications   Medication Sig    drospirenone-ethinyl estradiol (MARITA) 3-0.02 MG per tablet TAKE 1 TABLET BY MOUTH EVERY DAY    prochlorperazine (COMPAZINE) 5 MG tablet Take 1 tablet by mouth every 6 hours as needed for Nausea    ARIPiprazole (ABILIFY) 10 MG tablet Take 1 tablet by mouth every morning    ARIPiprazole (ABILIFY) 15 MG tablet Take 1 tablet by mouth nightly    buPROPion (WELLBUTRIN SR) 150 MG extended release tablet Take 1 tablet by mouth daily    meclizine (ANTIVERT) 25 MG tablet Take 1 tablet by mouth 3 times daily as needed    naproxen sodium (ANAPROX) 220 MG tablet Take 1 tablet by mouth as needed    OXcarbazepine (TRILEPTAL) 600 MG tablet Take 1 tablet by mouth 2 times daily    topiramate (TOPAMAX) 100 MG tablet Take 1 tablet by mouth 2 times daily     No current facility-administered medications for this visit.       Medical History  Past Medical History:   Diagnosis Date    Bipolar 1 disorder (HCC)     Depression     Family history of colon cancer in father     Intellectual disability        Immunizations   Immunization History   Administered Date(s) Administered    COVID-19, PFIZER PURPLE top,

## 2024-01-27 NOTE — PROGRESS NOTES
[facility] Attending Attestation: While the patient was in clinic or immediately following the patient leaving the clinic, I reviewed the patient's medical history, the resident's findings on physical examination, and the patient's diagnosis and treatment plan with the resident and agree with the documentation in the note.     Mars Medina MD

## 2024-01-27 NOTE — PROGRESS NOTES
[facility] Attending Attestation: While the patient was in clinic or immediately following the patient leaving the clinic, I reviewed the patient's medical history, the resident's findings on physical examination, and the patient's diagnosis and treatment plan with the resident and agree with the documentation in the note.     Mars Medina MD

## 2024-05-13 DIAGNOSIS — Z00.00 INITIAL MEDICARE ANNUAL WELLNESS VISIT: ICD-10-CM

## 2024-05-13 DIAGNOSIS — N94.89 SUPPRESSION OF MENSES: ICD-10-CM

## 2024-05-13 RX ORDER — DROSPIRENONE AND ETHINYL ESTRADIOL 0.02-3(28)
KIT ORAL
Qty: 28 TABLET | Refills: 4 | Status: SHIPPED | OUTPATIENT
Start: 2024-05-13

## 2024-09-23 ENCOUNTER — OFFICE VISIT (OUTPATIENT)
Age: 49
End: 2024-09-23
Payer: MEDICARE

## 2024-09-23 ENCOUNTER — ANCILLARY PROCEDURE (OUTPATIENT)
Age: 49
End: 2024-09-23
Payer: MEDICARE

## 2024-09-23 ENCOUNTER — TELEPHONE (OUTPATIENT)
Age: 49
End: 2024-09-23

## 2024-09-23 VITALS
DIASTOLIC BLOOD PRESSURE: 70 MMHG | HEART RATE: 80 BPM | RESPIRATION RATE: 16 BRPM | OXYGEN SATURATION: 98 % | SYSTOLIC BLOOD PRESSURE: 109 MMHG

## 2024-09-23 DIAGNOSIS — S99.922A INJURY OF TOE ON LEFT FOOT, INITIAL ENCOUNTER: ICD-10-CM

## 2024-09-23 DIAGNOSIS — M25.572 PAIN AND SWELLING OF LEFT ANKLE: Primary | ICD-10-CM

## 2024-09-23 DIAGNOSIS — M25.472 PAIN AND SWELLING OF LEFT ANKLE: ICD-10-CM

## 2024-09-23 DIAGNOSIS — M25.472 PAIN AND SWELLING OF LEFT ANKLE: Primary | ICD-10-CM

## 2024-09-23 DIAGNOSIS — M25.572 PAIN AND SWELLING OF LEFT ANKLE: ICD-10-CM

## 2024-09-23 PROCEDURE — 1036F TOBACCO NON-USER: CPT

## 2024-09-23 PROCEDURE — G8427 DOCREV CUR MEDS BY ELIG CLIN: HCPCS

## 2024-09-23 PROCEDURE — 73660 X-RAY EXAM OF TOE(S): CPT

## 2024-09-23 PROCEDURE — 99213 OFFICE O/P EST LOW 20 MIN: CPT

## 2024-09-23 PROCEDURE — 73610 X-RAY EXAM OF ANKLE: CPT

## 2024-09-23 PROCEDURE — 3017F COLORECTAL CA SCREEN DOC REV: CPT

## 2024-09-23 PROCEDURE — G8419 CALC BMI OUT NRM PARAM NOF/U: HCPCS

## 2024-10-10 DIAGNOSIS — N94.89 SUPPRESSION OF MENSES: ICD-10-CM

## 2024-10-10 DIAGNOSIS — Z00.00 INITIAL MEDICARE ANNUAL WELLNESS VISIT: ICD-10-CM

## 2024-10-10 RX ORDER — DROSPIRENONE AND ETHINYL ESTRADIOL 0.02-3(28)
1 KIT ORAL DAILY
Qty: 28 TABLET | Refills: 4 | Status: SHIPPED | OUTPATIENT
Start: 2024-10-10

## 2024-12-01 ENCOUNTER — OFFICE VISIT (OUTPATIENT)
Age: 49
End: 2024-12-01

## 2024-12-01 VITALS
TEMPERATURE: 97.9 F | WEIGHT: 181.2 LBS | RESPIRATION RATE: 20 BRPM | DIASTOLIC BLOOD PRESSURE: 76 MMHG | BODY MASS INDEX: 30.93 KG/M2 | HEIGHT: 64 IN | SYSTOLIC BLOOD PRESSURE: 115 MMHG | HEART RATE: 125 BPM | OXYGEN SATURATION: 94 %

## 2024-12-01 DIAGNOSIS — U07.1 COVID: Primary | ICD-10-CM

## 2024-12-01 DIAGNOSIS — R05.9 COUGH, UNSPECIFIED TYPE: ICD-10-CM

## 2024-12-01 DIAGNOSIS — R50.9 FEVER, UNSPECIFIED FEVER CAUSE: ICD-10-CM

## 2024-12-01 LAB
INFLUENZA A ANTIGEN, POC: NEGATIVE
INFLUENZA B ANTIGEN, POC: NEGATIVE
Lab: ABNORMAL
QC PASS/FAIL: ABNORMAL
SARS-COV-2 RDRP RESP QL NAA+PROBE: POSITIVE

## 2025-03-06 DIAGNOSIS — Z00.00 INITIAL MEDICARE ANNUAL WELLNESS VISIT: ICD-10-CM

## 2025-03-06 DIAGNOSIS — N94.89 SUPPRESSION OF MENSES: ICD-10-CM

## 2025-03-06 RX ORDER — DROSPIRENONE AND ETHINYL ESTRADIOL 0.02-3(28)
1 KIT ORAL DAILY
Qty: 28 TABLET | Refills: 0 | Status: SHIPPED | OUTPATIENT
Start: 2025-03-06

## 2025-03-12 ENCOUNTER — ANCILLARY PROCEDURE (OUTPATIENT)
Age: 50
End: 2025-03-12
Payer: MEDICAID

## 2025-03-12 ENCOUNTER — OFFICE VISIT (OUTPATIENT)
Age: 50
End: 2025-03-12
Payer: MEDICAID

## 2025-03-12 VITALS
SYSTOLIC BLOOD PRESSURE: 102 MMHG | HEIGHT: 64 IN | TEMPERATURE: 98.6 F | WEIGHT: 183.6 LBS | OXYGEN SATURATION: 96 % | HEART RATE: 78 BPM | DIASTOLIC BLOOD PRESSURE: 67 MMHG | RESPIRATION RATE: 20 BRPM | BODY MASS INDEX: 31.34 KG/M2

## 2025-03-12 DIAGNOSIS — M25.562 ACUTE PAIN OF LEFT KNEE: ICD-10-CM

## 2025-03-12 DIAGNOSIS — M25.562 ACUTE PAIN OF LEFT KNEE: Primary | ICD-10-CM

## 2025-03-12 PROCEDURE — 99213 OFFICE O/P EST LOW 20 MIN: CPT | Performed by: STUDENT IN AN ORGANIZED HEALTH CARE EDUCATION/TRAINING PROGRAM

## 2025-03-12 PROCEDURE — 73562 X-RAY EXAM OF KNEE 3: CPT

## 2025-03-12 RX ORDER — ACETAMINOPHEN 500 MG
1000 TABLET ORAL EVERY 6 HOURS PRN
COMMUNITY

## 2025-03-12 SDOH — ECONOMIC STABILITY: FOOD INSECURITY: WITHIN THE PAST 12 MONTHS, THE FOOD YOU BOUGHT JUST DIDN'T LAST AND YOU DIDN'T HAVE MONEY TO GET MORE.: NEVER TRUE

## 2025-03-12 SDOH — ECONOMIC STABILITY: FOOD INSECURITY: WITHIN THE PAST 12 MONTHS, YOU WORRIED THAT YOUR FOOD WOULD RUN OUT BEFORE YOU GOT MONEY TO BUY MORE.: NEVER TRUE

## 2025-03-12 ASSESSMENT — PATIENT HEALTH QUESTIONNAIRE - PHQ9
SUM OF ALL RESPONSES TO PHQ QUESTIONS 1-9: 0
2. FEELING DOWN, DEPRESSED OR HOPELESS: NOT AT ALL
SUM OF ALL RESPONSES TO PHQ QUESTIONS 1-9: 0
SUM OF ALL RESPONSES TO PHQ QUESTIONS 1-9: 0
1. LITTLE INTEREST OR PLEASURE IN DOING THINGS: NOT AT ALL
SUM OF ALL RESPONSES TO PHQ QUESTIONS 1-9: 0

## 2025-03-14 NOTE — PROGRESS NOTES
I discussed the findings, assessment and plan with the resident and agree with the resident's findings and plan as documented in the resident's note.  
Identified pt with two pt identifiers(name and ). Reviewed record in preparation for visit and have obtained necessary documentation.  Chief Complaint   Patient presents with    Knee Pain   Pt's mother states she is here for left knee pain.  She states the pain started a weeks go.  Pt is involved in a group from [employer] in which the pt is involved in many activities (walking in malls and [place], bowling).  Pt states she has pain to the left knee cap that radiates around to the back.  She states the pain increases when she is up walking around, not so much when sitting or laying down. She isn't able to extend or flex her knee completely. Pt's mother also states the pt has tried and Tylenol with little relief.     Health Maintenance Due   Topic    Hepatitis B vaccine (1 of 3 - 19+ 3-dose series)    Depression Monitoring     Shingles vaccine (1 of 2)    Pneumococcal 50+ years Vaccine (1 of 1 - PCV)    Flu vaccine (1)    COVID-19 Vaccine (3 - 2024-25 season)    Annual Wellness Visit (Medicare Advantage)        Vitals:    25 1502   BP: 102/67   BP Site: Right Upper Arm   Patient Position: Sitting   BP Cuff Size: Medium Adult   Pulse: 78   Resp: 20   Temp: 98.6 °F (37 °C)   TempSrc: Oral   SpO2: 96%   Weight: 83.3 kg (183 lb 9.6 oz)   Height: 1.626 m (5' 4\")         \"Have you been to the ER, urgent care clinic since your last visit?  Hospitalized since your last visit?\"    NO    “Have you seen or consulted any other health care providers outside of [facility] since your last visit?”    NO            Click Here for Release of Records Request     This patient is accompanied in the office by her mother.  I have received verbal consent from Jasmyn Parkinson to discuss any/all medical information while they are present in the room.  
daily, Disp: , Rfl:     topiramate (TOPAMAX) 100 MG tablet, Take 1 tablet by mouth 2 times daily, Disp: , Rfl:   No Known Allergies  Past Medical History:   Diagnosis Date    Bipolar 1 disorder (HCC)     Depression     Family history of colon cancer in father     Intellectual disability      Family History   Problem Relation Age of Onset    Cancer Father         colon    Depression Mother        ROS: As per HPI otherwise negative.    Objective:   /67 (BP Site: Right Upper Arm, Patient Position: Sitting, BP Cuff Size: Medium Adult)   Pulse 78   Temp 98.6 °F (37 °C) (Oral)   Resp 20   Ht 1.626 m (5' 4\")   Wt 83.3 kg (183 lb 9.6 oz)   SpO2 96%   BMI 31.51 kg/m²   Gen: Well appearing.  No apparent distress.  Alert and oriented.  Responds to all questions appropriately.  Lungs: No labored respirations.  Talking in complete sentences without difficulty.  Musculoskeletal:  Knee: Left  Knee Effusion: None  Quadriceps atrophy: None     ROM:  Flexion: 120, pain with passive and active flexion   Extension: 0     Dynamic Test:  Gait: Normal   Assistive devices: None    Palpation:   Patella tenderness: None  Patellar tendon tenderness: None  Quad tendon tenderness: None  Medial joint line tenderness: Positive   Lateral joint line tenderness: None  MCL tenderness: None  LCL tenderness: None  Tibia tubercle tenderness: None  Proximal fibula tenderness: None  Prepatellar bursa tenderness: None  ITB tenderness: None    Strength (0-5/5):   Flexion: Left: 5/5    Right: 5/5    Extension: Left: 5/5    Right: 5/5    Hip abduction: 5/5    Hip adduction: 5/5      Neuro/Vascular : Pulses intact, no edema, and neurologically intact .  Skin: No obvious rash or skin breakdown.    Imaging:   3/12/25 XR left knee reviewed independently by me revealing moderate to severe medial and patellofemoral compartment degenerative joint disease. Medial patellofemoral compartment osteophyte. No acute fracture, dislocation or other acute

## 2025-04-07 DIAGNOSIS — Z00.00 INITIAL MEDICARE ANNUAL WELLNESS VISIT: ICD-10-CM

## 2025-04-07 DIAGNOSIS — N94.89 SUPPRESSION OF MENSES: ICD-10-CM

## 2025-04-07 RX ORDER — DROSPIRENONE AND ETHINYL ESTRADIOL TABLETS 0.02-3(28)
1 KIT ORAL DAILY
Qty: 28 TABLET | Refills: 2 | Status: SHIPPED | OUTPATIENT
Start: 2025-04-07

## 2025-05-27 ENCOUNTER — TELEPHONE (OUTPATIENT)
Age: 50
End: 2025-05-27

## 2025-05-27 NOTE — TELEPHONE ENCOUNTER
This writer reached out to Nenita to verify her needs. Patient verified by 2 identifiers with Nenita. Nenita is inquiring for patients last office physical visit and need to get schedule for an annual appointment. Nenita also states patient received vaccines at the pharmacy. Informed Nenita I will print it out and place at the  for her to  and have someone at the front to contact her to get patient scheduled for an annual appointment. Once we receive the vaccine information from the pharmacy we will place them in her chart. Nenita verbalized understanding and agreement. No further questions at this time.

## 2025-06-17 ENCOUNTER — TELEPHONE (OUTPATIENT)
Age: 50
End: 2025-06-17

## 2025-06-17 NOTE — TELEPHONE ENCOUNTER
Lizette with [facility] called to ensure that a fax regarding pt's medications have been received. She stated that she need the fax to be signed, dated, and returned.     She stated that the fax was sent on 6/16.    Dr. Johns, please confirm if you have received fax.

## (undated) DEVICE — POLYP TRAP: Brand: TRAPEASE®

## (undated) DEVICE — ELECTRODE,RADIOTRANSLUCENT,FOAM,3PK: Brand: MEDLINE

## (undated) DEVICE — BAG SPEC BIOHZRD 10 X 10 IN --

## (undated) DEVICE — 1200 GUARD II KIT W/5MM TUBE W/O VAC TUBE: Brand: GUARDIAN

## (undated) DEVICE — SOLIDIFIER MEDC 1200ML -- CONVERT TO 356117

## (undated) DEVICE — CONTAINER SPEC 20 ML LID NEUT BUFF FORMALIN 10 % POLYPR STS

## (undated) DEVICE — CATH IV AUTOGRD BC PNK 20GA 25 -- INSYTE

## (undated) DEVICE — 3M™ CUROS™ DISINFECTING CAP FOR NEEDLELESS CONNECTORS 270/CARTON 20 CARTONS/CASE CFF1-270: Brand: CUROS™

## (undated) DEVICE — Device

## (undated) DEVICE — CANN NASAL O2 CAPNOGRAPHY AD -- FILTERLINE

## (undated) DEVICE — KIT COLON W/ 1.1OZ LUB AND 2 END

## (undated) DEVICE — SET GRAV CK VLV NEEDLESS ST 3 GANGED 4WAY STPCOCK HI FLO 10

## (undated) DEVICE — SIMPLICITY FLUFF UNDERPAD 23X36, MODERATE: Brand: SIMPLICITY

## (undated) DEVICE — SNARE ENDOSCP M L240CM W27MM SHTH DIA2.4MM CHN 2.8MM OVL

## (undated) DEVICE — CUFF RMFG BP INF SZ 11 DISP -- LAWSON OEM ITEM 238915

## (undated) DEVICE — (D)SENSOR RMFG 02 PULS OXMTR -- DISC BY MFR USE ITEM 133445

## (undated) DEVICE — CANNULA CUSH AD W/ 14FT TBG

## (undated) DEVICE — BAG BELONG PT PERS CLEAR HANDL